# Patient Record
Sex: FEMALE | Race: OTHER | Employment: UNEMPLOYED
[De-identification: names, ages, dates, MRNs, and addresses within clinical notes are randomized per-mention and may not be internally consistent; named-entity substitution may affect disease eponyms.]

---

## 2017-01-09 ENCOUNTER — TELEPHONE (OUTPATIENT)
Dept: OBGYN | Facility: CLINIC | Age: 37
End: 2017-01-09

## 2017-01-09 RX ORDER — SULFAMETHOXAZOLE AND TRIMETHOPRIM 800; 160 MG/1; MG/1
1 TABLET ORAL 2 TIMES DAILY
Qty: 14 TABLET | Refills: 1 | Status: SHIPPED | OUTPATIENT
Start: 2017-01-09 | End: 2017-01-13 | Stop reason: ALTCHOICE

## 2017-01-13 ENCOUNTER — OFFICE VISIT (OUTPATIENT)
Dept: OBGYN | Facility: CLINIC | Age: 37
End: 2017-01-13

## 2017-01-13 VITALS
BODY MASS INDEX: 26.89 KG/M2 | SYSTOLIC BLOOD PRESSURE: 113 MMHG | RESPIRATION RATE: 18 BRPM | DIASTOLIC BLOOD PRESSURE: 66 MMHG | WEIGHT: 147 LBS | HEART RATE: 74 BPM

## 2017-01-13 DIAGNOSIS — L98.9 SKIN LESION: Primary | ICD-10-CM

## 2017-01-13 DIAGNOSIS — N76.0 ACUTE VAGINITIS: ICD-10-CM

## 2017-01-13 DIAGNOSIS — Z12.4 PAP SMEAR FOR CERVICAL CANCER SCREENING: ICD-10-CM

## 2017-01-13 DIAGNOSIS — Z01.419 WELL WOMAN EXAM: ICD-10-CM

## 2017-01-13 DIAGNOSIS — N95.1 MENOPAUSAL SYMPTOM: ICD-10-CM

## 2017-01-13 PROCEDURE — 99213 OFFICE O/P EST LOW 20 MIN: CPT | Performed by: SPECIALIST

## 2017-01-13 RX ORDER — METRONIDAZOLE 500 MG/1
500 TABLET ORAL 2 TIMES DAILY
Qty: 14 TABLET | Refills: 0 | Status: SHIPPED | OUTPATIENT
Start: 2017-01-13 | End: 2017-02-10 | Stop reason: SDUPTHER

## 2017-01-13 RX ORDER — FLUCONAZOLE 100 MG/1
100 TABLET ORAL DAILY
Qty: 7 TABLET | Refills: 0 | Status: SHIPPED | OUTPATIENT
Start: 2017-01-13 | End: 2017-02-10 | Stop reason: SDUPTHER

## 2017-01-13 ASSESSMENT — ENCOUNTER SYMPTOMS
COUGH: 0
APNEA: 0
CONSTIPATION: 0
DIARRHEA: 0
ABDOMINAL PAIN: 0
NAUSEA: 0
EYE PAIN: 0
VOMITING: 0
ABDOMINAL DISTENTION: 0

## 2017-01-27 ENCOUNTER — OFFICE VISIT (OUTPATIENT)
Dept: OBGYN | Facility: CLINIC | Age: 37
End: 2017-01-27

## 2017-01-27 VITALS
SYSTOLIC BLOOD PRESSURE: 130 MMHG | WEIGHT: 151 LBS | HEIGHT: 63 IN | RESPIRATION RATE: 18 BRPM | DIASTOLIC BLOOD PRESSURE: 83 MMHG | BODY MASS INDEX: 26.75 KG/M2 | HEART RATE: 78 BPM

## 2017-01-27 DIAGNOSIS — A60.00 GENITAL HERPES SIMPLEX, UNSPECIFIED SITE: Primary | ICD-10-CM

## 2017-01-27 PROCEDURE — 99212 OFFICE O/P EST SF 10 MIN: CPT | Performed by: SPECIALIST

## 2017-02-03 ASSESSMENT — ENCOUNTER SYMPTOMS
CONSTIPATION: 0
VOMITING: 0
ABDOMINAL DISTENTION: 0
COUGH: 0
APNEA: 0
DIARRHEA: 0
EYE PAIN: 0
ABDOMINAL PAIN: 0
NAUSEA: 0

## 2017-02-10 ENCOUNTER — TELEPHONE (OUTPATIENT)
Dept: OBGYN | Facility: CLINIC | Age: 37
End: 2017-02-10

## 2017-02-10 RX ORDER — METRONIDAZOLE 500 MG/1
500 TABLET ORAL 2 TIMES DAILY
Qty: 14 TABLET | Refills: 0 | Status: SHIPPED | OUTPATIENT
Start: 2017-02-10 | End: 2017-02-17

## 2017-02-10 RX ORDER — FLUCONAZOLE 100 MG/1
100 TABLET ORAL DAILY
Qty: 7 TABLET | Refills: 0 | Status: SHIPPED | OUTPATIENT
Start: 2017-02-10 | End: 2017-02-17

## 2017-06-20 ENCOUNTER — TELEPHONE (OUTPATIENT)
Dept: OBGYN CLINIC | Age: 37
End: 2017-06-20

## 2017-06-20 RX ORDER — FLUCONAZOLE 100 MG/1
100 TABLET ORAL DAILY
Qty: 7 TABLET | Refills: 0 | Status: SHIPPED | OUTPATIENT
Start: 2017-06-20 | End: 2017-06-27

## 2017-06-20 RX ORDER — METRONIDAZOLE 500 MG/1
500 TABLET ORAL 2 TIMES DAILY
Qty: 14 TABLET | Refills: 0 | Status: SHIPPED | OUTPATIENT
Start: 2017-06-20 | End: 2017-06-27

## 2017-07-19 ENCOUNTER — TELEPHONE (OUTPATIENT)
Dept: OBGYN CLINIC | Age: 37
End: 2017-07-19

## 2017-07-19 RX ORDER — METRONIDAZOLE 500 MG/1
500 TABLET ORAL 2 TIMES DAILY
Qty: 14 TABLET | Refills: 0 | Status: SHIPPED | OUTPATIENT
Start: 2017-07-19 | End: 2017-07-26

## 2017-07-19 RX ORDER — FLUCONAZOLE 100 MG/1
100 TABLET ORAL DAILY
Qty: 7 TABLET | Refills: 0 | Status: SHIPPED | OUTPATIENT
Start: 2017-07-19 | End: 2017-07-26

## 2017-08-21 ENCOUNTER — TELEPHONE (OUTPATIENT)
Dept: OBGYN CLINIC | Age: 37
End: 2017-08-21

## 2017-08-21 DIAGNOSIS — N76.0 ACUTE VAGINITIS: Primary | ICD-10-CM

## 2017-08-21 RX ORDER — METRONIDAZOLE 500 MG/1
500 TABLET ORAL 2 TIMES DAILY
Qty: 14 TABLET | Refills: 0 | Status: SHIPPED | OUTPATIENT
Start: 2017-08-21 | End: 2017-08-28

## 2017-08-21 RX ORDER — FLUCONAZOLE 100 MG/1
100 TABLET ORAL DAILY
Qty: 7 TABLET | Refills: 0 | Status: SHIPPED | OUTPATIENT
Start: 2017-08-21 | End: 2017-08-28

## 2017-10-06 ENCOUNTER — TELEPHONE (OUTPATIENT)
Dept: OBGYN CLINIC | Age: 37
End: 2017-10-06

## 2017-10-06 NOTE — TELEPHONE ENCOUNTER
Patient calls, complains of white vaginal discharge with odor and itching. Message routed to DR for further action.

## 2017-10-08 RX ORDER — FLUCONAZOLE 100 MG/1
100 TABLET ORAL DAILY
Qty: 7 TABLET | Refills: 0 | Status: SHIPPED | OUTPATIENT
Start: 2017-10-08 | End: 2017-10-15

## 2017-10-08 RX ORDER — METRONIDAZOLE 500 MG/1
500 TABLET ORAL 2 TIMES DAILY
Qty: 14 TABLET | Refills: 0 | Status: SHIPPED | OUTPATIENT
Start: 2017-10-08 | End: 2017-10-15

## 2017-11-24 ENCOUNTER — TELEPHONE (OUTPATIENT)
Dept: OBGYN CLINIC | Age: 37
End: 2017-11-24

## 2017-11-24 RX ORDER — METRONIDAZOLE 500 MG/1
500 TABLET ORAL 2 TIMES DAILY
Qty: 14 TABLET | Refills: 0 | Status: SHIPPED | OUTPATIENT
Start: 2017-11-24 | End: 2017-12-01

## 2017-11-24 RX ORDER — FLUCONAZOLE 100 MG/1
100 TABLET ORAL DAILY
Qty: 7 TABLET | Refills: 0 | Status: SHIPPED | OUTPATIENT
Start: 2017-11-24 | End: 2017-12-01

## 2017-11-28 ENCOUNTER — HOSPITAL ENCOUNTER (OUTPATIENT)
Age: 37
Setting detail: SPECIMEN
Discharge: HOME | End: 2017-11-28

## 2017-11-28 ENCOUNTER — OFFICE VISIT (OUTPATIENT)
Dept: OBGYN CLINIC | Age: 37
End: 2017-11-28

## 2017-11-28 VITALS
DIASTOLIC BLOOD PRESSURE: 77 MMHG | RESPIRATION RATE: 18 BRPM | WEIGHT: 148 LBS | HEART RATE: 78 BPM | BODY MASS INDEX: 26.22 KG/M2 | SYSTOLIC BLOOD PRESSURE: 113 MMHG

## 2017-11-28 DIAGNOSIS — N76.0 ACUTE VAGINITIS: Primary | ICD-10-CM

## 2017-11-28 PROCEDURE — 99213 OFFICE O/P EST LOW 20 MIN: CPT | Performed by: SPECIALIST

## 2017-11-28 RX ORDER — METRONIDAZOLE 500 MG/1
500 TABLET ORAL 2 TIMES DAILY
Qty: 14 TABLET | Refills: 0 | Status: SHIPPED | OUTPATIENT
Start: 2017-11-28 | End: 2017-12-05

## 2017-11-28 ASSESSMENT — ENCOUNTER SYMPTOMS
ABDOMINAL DISTENTION: 0
EYE PAIN: 0
CONSTIPATION: 0
APNEA: 0
COUGH: 0
NAUSEA: 0
VOMITING: 0
ABDOMINAL PAIN: 0
DIARRHEA: 0

## 2017-11-28 NOTE — PROGRESS NOTES
Subjective:      Patient ID: Elisa Faria is a 44 y.o. female. Chief Complaint   Patient presents with    Vaginal Discharge     Patient got flagyl and diflucan last week and she feels like it is not working. She is still having discharge and odor.  Vaginal Odor     /77 (Site: Left Arm, Position: Sitting, Cuff Size: Small Adult)   Pulse 78   Resp 18   Wt 148 lb (67.1 kg)   BMI 26.22 kg/m²   No LMP recorded. Patient has had a hysterectomy. No obstetric history on file. No past medical history on file. Current Outpatient Prescriptions Ordered in [facility]   Medication Sig Dispense Refill    terconazole (TERAZOL 7) 0.4 % vaginal cream One applicator intravaginally at bedtime for 7 days. 1 Tube 0    metroNIDAZOLE (FLAGYL) 500 MG tablet Take 1 tablet by mouth 2 times daily for 7 days 14 tablet 0    metroNIDAZOLE (FLAGYL) 500 MG tablet Take 1 tablet by mouth 2 times daily for 7 days 14 tablet 0    fluconazole (DIFLUCAN) 100 MG tablet Take 1 tablet by mouth daily for 7 days 7 tablet 0     No current Epic-ordered facility-administered medications on file. Problem List Items Addressed This Visit     None      Visit Diagnoses     Acute vaginitis    -  Primary    Relevant Orders    VAGINITIS DNA PROBE        No Known Allergies  Orders Placed This Encounter   Procedures    VAGINITIS DNA PROBE     Standing Status:   Future     Standing Expiration Date:   11/28/2018        Patient is here today complaining of a vaginal discharge with odor. She states that she took Flagyl and Diflucan last week, but it is not working. She denies nausea, vomiting and fever. Review of Systems   Constitutional: Negative for activity change, appetite change and fever. HENT: Negative for ear discharge and ear pain. Eyes: Negative for pain and visual disturbance. Respiratory: Negative for apnea and cough. Cardiovascular: Negative for chest pain, palpitations and leg swelling.    Gastrointestinal: Negative for abdominal distention, abdominal pain, constipation, diarrhea, nausea and vomiting. Endocrine: Negative. Genitourinary: Negative for difficulty urinating, dysuria, menstrual problem and pelvic pain. Musculoskeletal: Negative for neck pain and neck stiffness. Skin: Negative. Neurological: Negative for light-headedness and numbness. Hematological: Negative. Does not bruise/bleed easily. Objective:   Physical Exam   Constitutional: She is oriented to person, place, and time. Vital signs are normal. She appears well-developed and well-nourished. HENT:   Head: Normocephalic and atraumatic. Neck: Normal range of motion. Neck supple. No thyromegaly present. Cardiovascular: Normal rate and regular rhythm. Pulmonary/Chest: Effort normal and breath sounds normal. She has no wheezes. Abdominal: Soft. Bowel sounds are normal. She exhibits no distension and no mass. There is no tenderness. There is no guarding. Genitourinary: Cervix exhibits discharge. Vaginal discharge found. Genitourinary Comments:      Musculoskeletal: Normal range of motion. Neurological: She is alert and oriented to person, place, and time. Skin: Skin is dry. Psychiatric: She has a normal mood and affect. Her behavior is normal. Thought content normal.   Nursing note and vitals reviewed. Assessment:      Patient with vaginitis. Cultures were done. Will treat with another course of Flagyl and Terazol 7. Plan:      Orders Placed This Encounter   Procedures    VAGINITIS DNA PROBE     Orders Placed This Encounter   Medications    terconazole (TERAZOL 7) 0.4 % vaginal cream     Sig: One applicator intravaginally at bedtime for 7 days.      Dispense:  1 Tube     Refill:  0    metroNIDAZOLE (FLAGYL) 500 MG tablet     Sig: Take 1 tablet by mouth 2 times daily for 7 days     Dispense:  14 tablet     Refill:  0      Appointment for annual exam.    Jo Ann THOMAS am scribing for, and in the

## 2017-11-29 LAB
DIRECT EXAM: NORMAL
Lab: NORMAL
SPECIMEN DESCRIPTION: NORMAL
STATUS: NORMAL

## 2018-01-16 ENCOUNTER — TELEPHONE (OUTPATIENT)
Dept: OBGYN CLINIC | Age: 38
End: 2018-01-16

## 2018-01-16 RX ORDER — ACYCLOVIR 200 MG/1
200 CAPSULE ORAL 3 TIMES DAILY
Qty: 30 CAPSULE | Refills: 1 | Status: SHIPPED | OUTPATIENT
Start: 2018-01-16 | End: 2018-06-29 | Stop reason: SDUPTHER

## 2018-01-16 RX ORDER — FLUCONAZOLE 100 MG/1
100 TABLET ORAL DAILY
Qty: 7 TABLET | Refills: 0 | Status: SHIPPED | OUTPATIENT
Start: 2018-01-16 | End: 2018-01-23

## 2018-05-29 ENCOUNTER — HOSPITAL ENCOUNTER (OUTPATIENT)
Age: 38
Setting detail: SPECIMEN
Discharge: HOME | End: 2018-05-29

## 2018-05-29 ENCOUNTER — OFFICE VISIT (OUTPATIENT)
Dept: OBGYN CLINIC | Age: 38
End: 2018-05-29

## 2018-05-29 VITALS
RESPIRATION RATE: 18 BRPM | DIASTOLIC BLOOD PRESSURE: 81 MMHG | HEIGHT: 63 IN | BODY MASS INDEX: 26.58 KG/M2 | SYSTOLIC BLOOD PRESSURE: 118 MMHG | WEIGHT: 150 LBS | HEART RATE: 72 BPM

## 2018-05-29 DIAGNOSIS — Z01.419 WELL WOMAN EXAM: Primary | ICD-10-CM

## 2018-05-29 DIAGNOSIS — N76.0 ACUTE VAGINITIS: ICD-10-CM

## 2018-05-29 DIAGNOSIS — Z12.72 SCREENING FOR VAGINAL CANCER: ICD-10-CM

## 2018-05-29 DIAGNOSIS — Z90.710 STATUS POST HYSTERECTOMY: ICD-10-CM

## 2018-05-29 PROCEDURE — 99395 PREV VISIT EST AGE 18-39: CPT | Performed by: SPECIALIST

## 2018-05-29 RX ORDER — FLUCONAZOLE 100 MG/1
100 TABLET ORAL DAILY
Qty: 7 TABLET | Refills: 0 | Status: SHIPPED | OUTPATIENT
Start: 2018-05-29 | End: 2018-05-29 | Stop reason: SDUPTHER

## 2018-05-29 RX ORDER — METRONIDAZOLE 500 MG/1
500 TABLET ORAL 2 TIMES DAILY
Qty: 14 TABLET | Refills: 0 | Status: SHIPPED | OUTPATIENT
Start: 2018-05-29 | End: 2018-06-05

## 2018-05-29 RX ORDER — FLUCONAZOLE 100 MG/1
100 TABLET ORAL DAILY
Qty: 7 TABLET | Refills: 0 | Status: SHIPPED | OUTPATIENT
Start: 2018-05-29 | End: 2018-06-05

## 2018-05-29 ASSESSMENT — ENCOUNTER SYMPTOMS
APNEA: 0
ABDOMINAL DISTENTION: 0
EYE PAIN: 0
CONSTIPATION: 0
NAUSEA: 0
DIARRHEA: 0
COUGH: 0
VOMITING: 0
ABDOMINAL PAIN: 0

## 2018-06-20 LAB — CYTOLOGY REPORT: NORMAL

## 2018-06-29 RX ORDER — ACYCLOVIR 200 MG/1
200 CAPSULE ORAL 3 TIMES DAILY
Qty: 30 CAPSULE | Refills: 1 | Status: SHIPPED | OUTPATIENT
Start: 2018-06-29 | End: 2018-07-09

## 2018-08-06 ENCOUNTER — TELEPHONE (OUTPATIENT)
Dept: OBGYN CLINIC | Age: 38
End: 2018-08-06

## 2018-08-06 RX ORDER — SULFAMETHOXAZOLE AND TRIMETHOPRIM 800; 160 MG/1; MG/1
1 TABLET ORAL 2 TIMES DAILY
Qty: 14 TABLET | Refills: 0 | Status: SHIPPED | OUTPATIENT
Start: 2018-08-06 | End: 2018-08-16

## 2018-09-21 ENCOUNTER — TELEPHONE (OUTPATIENT)
Dept: OBGYN CLINIC | Age: 38
End: 2018-09-21

## 2018-09-22 RX ORDER — FLUCONAZOLE 100 MG/1
100 TABLET ORAL DAILY
Qty: 7 TABLET | Refills: 0 | Status: SHIPPED | OUTPATIENT
Start: 2018-09-22 | End: 2018-10-01 | Stop reason: SDUPTHER

## 2018-09-22 RX ORDER — METRONIDAZOLE 500 MG/1
500 TABLET ORAL 2 TIMES DAILY
Qty: 14 TABLET | Refills: 0 | Status: SHIPPED | OUTPATIENT
Start: 2018-09-22 | End: 2018-10-01 | Stop reason: SDUPTHER

## 2018-10-01 ENCOUNTER — TELEPHONE (OUTPATIENT)
Dept: OBGYN CLINIC | Age: 38
End: 2018-10-01

## 2018-10-01 RX ORDER — METRONIDAZOLE 500 MG/1
500 TABLET ORAL 2 TIMES DAILY
Qty: 14 TABLET | Refills: 0 | Status: SHIPPED | OUTPATIENT
Start: 2018-10-01 | End: 2018-10-08

## 2018-10-01 RX ORDER — FLUCONAZOLE 100 MG/1
100 TABLET ORAL DAILY
Qty: 7 TABLET | Refills: 0 | Status: SHIPPED | OUTPATIENT
Start: 2018-10-01 | End: 2018-10-08

## 2018-10-12 ENCOUNTER — OFFICE VISIT (OUTPATIENT)
Dept: OBGYN CLINIC | Age: 38
End: 2018-10-12

## 2018-10-12 VITALS
DIASTOLIC BLOOD PRESSURE: 83 MMHG | SYSTOLIC BLOOD PRESSURE: 115 MMHG | WEIGHT: 152 LBS | HEART RATE: 80 BPM | HEIGHT: 62 IN | RESPIRATION RATE: 16 BRPM | BODY MASS INDEX: 27.97 KG/M2

## 2018-10-12 DIAGNOSIS — R30.9 PAINFUL URINATION: Primary | ICD-10-CM

## 2018-10-12 DIAGNOSIS — Z78.0 POSTMENOPAUSE: ICD-10-CM

## 2018-10-12 LAB
BILIRUBIN, POC: NEGATIVE
BLOOD URINE, POC: NORMAL
CLARITY, POC: CLEAR
COLOR, POC: NORMAL
GLUCOSE URINE, POC: NEGATIVE
KETONES, POC: NEGATIVE
LEUKOCYTE EST, POC: NEGATIVE
NITRITE, POC: NEGATIVE
PH, POC: 5
PROTEIN, POC: NORMAL
SPECIFIC GRAVITY, POC: 1.03
UROBILINOGEN, POC: NORMAL

## 2018-10-12 PROCEDURE — 99213 OFFICE O/P EST LOW 20 MIN: CPT | Performed by: SPECIALIST

## 2018-10-12 PROCEDURE — 81002 URINALYSIS NONAUTO W/O SCOPE: CPT | Performed by: SPECIALIST

## 2018-10-12 RX ORDER — BUPROPION HYDROCHLORIDE 100 MG/1
100 TABLET, EXTENDED RELEASE ORAL 2 TIMES DAILY
Qty: 60 TABLET | Refills: 3 | Status: SHIPPED | OUTPATIENT
Start: 2018-10-12 | End: 2020-05-18 | Stop reason: SDUPTHER

## 2018-10-12 RX ORDER — ESTRADIOL 1 MG/1
1 TABLET ORAL DAILY
Qty: 21 TABLET | Refills: 3 | Status: SHIPPED | OUTPATIENT
Start: 2018-10-12 | End: 2019-04-10 | Stop reason: SDUPTHER

## 2018-10-12 ASSESSMENT — ENCOUNTER SYMPTOMS
EYE PAIN: 0
ABDOMINAL DISTENTION: 0
DIARRHEA: 0
APNEA: 0
ABDOMINAL PAIN: 0
VOMITING: 0
NAUSEA: 0
CONSTIPATION: 0
COUGH: 0

## 2018-10-12 NOTE — PROGRESS NOTES
for, and in the presence of Dr. Braden Martinez. Electronically signed by: Abigail Kruse 10/12/18 12:30 PM       I agree to the above documentation placed by my scribe Abigail Kruse. I reviewed the scribe's note and agree with the documented findings and plan of care. Any areas of disagreement are noted on the chart. I have personally evaluated this patient. Additional findings are as noted. I agree with the chief complaint, past medical history, past surgical history, allergies, medications, social and family history as documented unless otherwise noted below.      Electronically signed by Braden Martinez MD on 10/14/2018 at 5:16 AM

## 2018-11-27 ENCOUNTER — TELEPHONE (OUTPATIENT)
Dept: OBGYN CLINIC | Age: 38
End: 2018-11-27

## 2018-11-27 NOTE — TELEPHONE ENCOUNTER
Patient calls, complains of white vaginal discharge with itching and refill on zovirax. Message routed to DR for further action.

## 2018-11-28 RX ORDER — METRONIDAZOLE 500 MG/1
500 TABLET ORAL 2 TIMES DAILY
Qty: 14 TABLET | Refills: 0 | Status: SHIPPED | OUTPATIENT
Start: 2018-11-28 | End: 2018-12-05

## 2018-11-28 RX ORDER — FLUCONAZOLE 100 MG/1
100 TABLET ORAL DAILY
Qty: 7 TABLET | Refills: 0 | Status: SHIPPED | OUTPATIENT
Start: 2018-11-28 | End: 2018-12-05

## 2018-11-28 RX ORDER — ACYCLOVIR 200 MG/1
200 CAPSULE ORAL 3 TIMES DAILY
Qty: 30 CAPSULE | Refills: 1 | Status: SHIPPED | OUTPATIENT
Start: 2018-11-28 | End: 2018-12-08

## 2019-01-22 ENCOUNTER — TELEPHONE (OUTPATIENT)
Dept: OBGYN CLINIC | Age: 39
End: 2019-01-22

## 2019-01-22 RX ORDER — NITROFURANTOIN 25; 75 MG/1; MG/1
100 CAPSULE ORAL 2 TIMES DAILY
Qty: 20 CAPSULE | Refills: 0 | Status: SHIPPED | OUTPATIENT
Start: 2019-01-22 | End: 2019-02-01

## 2019-02-21 ENCOUNTER — TELEPHONE (OUTPATIENT)
Dept: OBGYN CLINIC | Age: 39
End: 2019-02-21

## 2019-02-21 RX ORDER — METRONIDAZOLE 500 MG/1
500 TABLET ORAL 2 TIMES DAILY
Qty: 14 TABLET | Refills: 0 | Status: SHIPPED | OUTPATIENT
Start: 2019-02-21 | End: 2019-02-28

## 2019-02-21 RX ORDER — FLUCONAZOLE 100 MG/1
100 TABLET ORAL DAILY
Qty: 7 TABLET | Refills: 0 | Status: SHIPPED | OUTPATIENT
Start: 2019-02-21 | End: 2019-02-28

## 2019-04-04 ENCOUNTER — OFFICE VISIT (OUTPATIENT)
Dept: OBGYN CLINIC | Age: 39
End: 2019-04-04

## 2019-04-04 ENCOUNTER — HOSPITAL ENCOUNTER (OUTPATIENT)
Age: 39
Setting detail: SPECIMEN
Discharge: HOME | End: 2019-04-04

## 2019-04-04 VITALS
WEIGHT: 151 LBS | DIASTOLIC BLOOD PRESSURE: 83 MMHG | SYSTOLIC BLOOD PRESSURE: 124 MMHG | BODY MASS INDEX: 27.79 KG/M2 | HEIGHT: 62 IN | HEART RATE: 96 BPM

## 2019-04-04 DIAGNOSIS — R45.86 MOOD SWING: ICD-10-CM

## 2019-04-04 DIAGNOSIS — Z90.710 STATUS POST HYSTERECTOMY: ICD-10-CM

## 2019-04-04 DIAGNOSIS — N76.0 ACUTE VAGINITIS: Primary | ICD-10-CM

## 2019-04-04 DIAGNOSIS — N76.0 ACUTE VAGINITIS: ICD-10-CM

## 2019-04-04 LAB
ESTRADIOL LEVEL: 380 PG/ML (ref 27–314)
FOLLICLE STIMULATING HORMONE: 4.8 U/L (ref 1.7–21.5)
LH: 11.6 U/L (ref 1–95.6)
TSH SERPL DL<=0.05 MIU/L-ACNC: 1.28 MIU/L (ref 0.3–5)

## 2019-04-04 PROCEDURE — 99213 OFFICE O/P EST LOW 20 MIN: CPT | Performed by: SPECIALIST

## 2019-04-04 RX ORDER — METRONIDAZOLE 500 MG/1
500 TABLET ORAL 2 TIMES DAILY
Qty: 14 TABLET | Refills: 0 | Status: SHIPPED | OUTPATIENT
Start: 2019-04-04 | End: 2019-04-11

## 2019-04-04 RX ORDER — FLUCONAZOLE 100 MG/1
100 TABLET ORAL DAILY
Qty: 7 TABLET | Refills: 0 | Status: SHIPPED | OUTPATIENT
Start: 2019-04-04 | End: 2019-04-11

## 2019-04-04 ASSESSMENT — ENCOUNTER SYMPTOMS
CONSTIPATION: 0
DIARRHEA: 0
EYE PAIN: 0
ABDOMINAL PAIN: 0
COUGH: 0
APNEA: 0
NAUSEA: 0
VOMITING: 0
ABDOMINAL DISTENTION: 0

## 2019-04-04 NOTE — PROGRESS NOTES
Subjective:      Patient ID: Ravinder Harry is a 43 y.o. female. Chief Complaint   Patient presents with    Vaginal Discharge     Patient is here today for vaginal discharge. She has no pain or itching      /83 (Site: Right Upper Arm, Position: Sitting, Cuff Size: Medium Adult)   Pulse 96   Ht 5' 2\" (1.575 m)   Wt 151 lb (68.5 kg)   BMI 27.62 kg/m²   No LMP recorded. Patient has had a hysterectomy. No obstetric history on file. No past medical history on file. Current Outpatient Medications Ordered in Epic   Medication Sig Dispense Refill    metroNIDAZOLE (FLAGYL) 500 MG tablet Take 1 tablet by mouth 2 times daily for 7 days 14 tablet 0    fluconazole (DIFLUCAN) 100 MG tablet Take 1 tablet by mouth daily for 7 days 7 tablet 0    estradiol (ESTRACE) 1 MG tablet Take 1 tablet by mouth daily 21 tablet 3    buPROPion (WELLBUTRIN SR) 100 MG extended release tablet Take 1 tablet by mouth 2 times daily 60 tablet 3     No current Epic-ordered facility-administered medications on file.       Problem List Items Addressed This Visit     None      Visit Diagnoses     Acute vaginitis    -  Primary    Relevant Orders    Follicle Stimulating Hormone    Luteinizing Hormone    TSH without Reflex    Estradiol    Mood swing        Relevant Orders    Follicle Stimulating Hormone    Luteinizing Hormone    TSH without Reflex    Estradiol    Status post hysterectomy        Relevant Orders    Follicle Stimulating Hormone    Luteinizing Hormone    TSH without Reflex    Estradiol        No Known Allergies  Orders Placed This Encounter   Procedures    Follicle Stimulating Hormone     Standing Status:   Future     Standing Expiration Date:   6/4/2019    Luteinizing Hormone     Standing Status:   Future     Standing Expiration Date:   6/4/2019    TSH without Reflex     Standing Status:   Future     Standing Expiration Date:   6/4/2019    Estradiol     Standing Status:   Future     Standing Expiration Date: Psychiatric: She has a normal mood and affect. Her behavior is normal. Thought content normal.   Nursing note and vitals reviewed. Assessment:      Patient with vaginitis. Will treat with Flagyl and Diflucan. Patient status post hysterectomy with mood swings. Will evaluate with FSH, LH, TSH, and Estradiol. Plan:       Orders Placed This Encounter   Procedures    Follicle Stimulating Hormone    Luteinizing Hormone    TSH without Reflex    Estradiol     Orders Placed This Encounter   Medications    metroNIDAZOLE (FLAGYL) 500 MG tablet     Sig: Take 1 tablet by mouth 2 times daily for 7 days     Dispense:  14 tablet     Refill:  0    fluconazole (DIFLUCAN) 100 MG tablet     Sig: Take 1 tablet by mouth daily for 7 days     Dispense:  7 tablet     Refill:  0      Appointment for annual exam.    IAbdoul, am scribing for, and in the presence of Dr. Magdaleno Arredondo. Electronically signed by: Abdoul Multani 4/4/19 2:06 PM       I agree to the above documentation placed by my scribe Abdoul Multani. I reviewed the scribe's note and agree with the documented findings and plan of care. Any areas of disagreement are noted on the chart. I have personally evaluated this patient. Additional findings are as noted. I agree with the chief complaint, past medical history, past surgical history, allergies, medications, social and family history as documented unless otherwise noted below.      Electronically signed by Magdaleno Arredondo MD on 4/6/2019 at 8:23 PM

## 2019-04-10 RX ORDER — ESTRADIOL 1 MG/1
1 TABLET ORAL DAILY
Qty: 21 TABLET | Refills: 3 | Status: SHIPPED | OUTPATIENT
Start: 2019-04-10 | End: 2020-05-18 | Stop reason: SDUPTHER

## 2019-06-06 ENCOUNTER — TELEPHONE (OUTPATIENT)
Dept: OBGYN CLINIC | Age: 39
End: 2019-06-06

## 2019-06-06 RX ORDER — METRONIDAZOLE 500 MG/1
500 TABLET ORAL 2 TIMES DAILY
Qty: 14 TABLET | Refills: 0 | Status: SHIPPED | OUTPATIENT
Start: 2019-06-06 | End: 2019-06-13

## 2019-06-06 RX ORDER — FLUCONAZOLE 100 MG/1
100 TABLET ORAL DAILY
Qty: 7 TABLET | Refills: 0 | Status: SHIPPED | OUTPATIENT
Start: 2019-06-06 | End: 2019-06-13

## 2019-06-06 NOTE — TELEPHONE ENCOUNTER
Patient called stating she is on antibiotics for an ear infection and she is now having a lot of vaginal discharge and is requesting medication to be sent

## 2019-06-26 RX ORDER — FLUCONAZOLE 100 MG/1
100 TABLET ORAL DAILY
COMMUNITY
End: 2019-06-26 | Stop reason: SDUPTHER

## 2019-06-26 RX ORDER — METRONIDAZOLE 500 MG/1
500 TABLET ORAL 2 TIMES DAILY
COMMUNITY
End: 2019-06-26 | Stop reason: SDUPTHER

## 2019-06-27 RX ORDER — FLUCONAZOLE 100 MG/1
100 TABLET ORAL DAILY
Qty: 7 TABLET | Refills: 0 | Status: SHIPPED | OUTPATIENT
Start: 2019-06-27 | End: 2020-08-10 | Stop reason: CLARIF

## 2019-06-27 RX ORDER — METRONIDAZOLE 500 MG/1
500 TABLET ORAL 2 TIMES DAILY
Qty: 14 TABLET | Refills: 0 | Status: SHIPPED | OUTPATIENT
Start: 2019-06-27 | End: 2020-08-10 | Stop reason: CLARIF

## 2019-07-30 ENCOUNTER — OFFICE VISIT (OUTPATIENT)
Dept: OBGYN CLINIC | Age: 39
End: 2019-07-30
Payer: MEDICAID

## 2019-07-30 ENCOUNTER — HOSPITAL ENCOUNTER (OUTPATIENT)
Age: 39
Setting detail: SPECIMEN
Discharge: HOME | End: 2019-07-30

## 2019-07-30 VITALS
BODY MASS INDEX: 27.23 KG/M2 | SYSTOLIC BLOOD PRESSURE: 118 MMHG | HEIGHT: 62 IN | HEART RATE: 72 BPM | DIASTOLIC BLOOD PRESSURE: 80 MMHG | WEIGHT: 148 LBS

## 2019-07-30 DIAGNOSIS — N64.4 BREAST PAIN: ICD-10-CM

## 2019-07-30 DIAGNOSIS — N63.20 LEFT BREAST LUMP: ICD-10-CM

## 2019-07-30 DIAGNOSIS — Z01.419 WELL WOMAN EXAM: Primary | ICD-10-CM

## 2019-07-30 DIAGNOSIS — N76.0 ACUTE VAGINITIS: ICD-10-CM

## 2019-07-30 DIAGNOSIS — Z90.710 STATUS POST HYSTERECTOMY: ICD-10-CM

## 2019-07-30 PROCEDURE — 99395 PREV VISIT EST AGE 18-39: CPT | Performed by: SPECIALIST

## 2019-07-30 RX ORDER — METRONIDAZOLE 500 MG/1
500 TABLET ORAL 2 TIMES DAILY
Qty: 14 TABLET | Refills: 0 | Status: SHIPPED | OUTPATIENT
Start: 2019-07-30 | End: 2019-08-06

## 2019-07-30 RX ORDER — FLUCONAZOLE 100 MG/1
100 TABLET ORAL DAILY
Qty: 7 TABLET | Refills: 0 | Status: SHIPPED | OUTPATIENT
Start: 2019-07-30 | End: 2019-08-06

## 2019-07-30 ASSESSMENT — ENCOUNTER SYMPTOMS
DIARRHEA: 0
NAUSEA: 0
CONSTIPATION: 0
APNEA: 0
ABDOMINAL DISTENTION: 0
ABDOMINAL PAIN: 0
VOMITING: 0
COUGH: 0
EYE PAIN: 0

## 2019-08-02 LAB
CYTOLOGY REPORT: NORMAL
HPV SAMPLE: NORMAL
HPV, GENOTYPE 16: NOT DETECTED
HPV, GENOTYPE 18: NOT DETECTED
HPV, HIGH RISK OTHER: NOT DETECTED
HPV, INTERPRETATION: NORMAL
SPECIMEN DESCRIPTION: NORMAL

## 2019-08-14 ENCOUNTER — HOSPITAL ENCOUNTER (OUTPATIENT)
Dept: MAMMOGRAPHY | Age: 39
Discharge: HOME | End: 2019-08-16

## 2019-08-14 DIAGNOSIS — N63.20 LEFT BREAST LUMP: ICD-10-CM

## 2019-08-14 DIAGNOSIS — N64.4 BREAST PAIN: ICD-10-CM

## 2019-08-14 DIAGNOSIS — Z90.710 STATUS POST HYSTERECTOMY: ICD-10-CM

## 2019-08-14 DIAGNOSIS — N76.0 ACUTE VAGINITIS: ICD-10-CM

## 2019-08-14 DIAGNOSIS — Z01.419 WELL WOMAN EXAM: ICD-10-CM

## 2019-08-14 PROCEDURE — 77066 DX MAMMO INCL CAD BI: CPT

## 2019-11-25 ENCOUNTER — TELEPHONE (OUTPATIENT)
Dept: OBGYN CLINIC | Age: 39
End: 2019-11-25

## 2019-11-25 RX ORDER — FLUCONAZOLE 100 MG/1
100 TABLET ORAL DAILY
Qty: 7 TABLET | Refills: 0 | Status: SHIPPED | OUTPATIENT
Start: 2019-11-25 | End: 2019-12-02

## 2019-11-25 RX ORDER — METRONIDAZOLE 500 MG/1
500 TABLET ORAL 2 TIMES DAILY
Qty: 14 TABLET | Refills: 0 | Status: SHIPPED | OUTPATIENT
Start: 2019-11-25 | End: 2019-12-02

## 2019-12-12 ENCOUNTER — TELEPHONE (OUTPATIENT)
Dept: OBGYN CLINIC | Age: 39
End: 2019-12-12

## 2019-12-13 RX ORDER — METRONIDAZOLE 500 MG/1
500 TABLET ORAL 2 TIMES DAILY
Qty: 14 TABLET | Refills: 0 | Status: SHIPPED | OUTPATIENT
Start: 2019-12-13 | End: 2019-12-20

## 2019-12-13 RX ORDER — FLUCONAZOLE 100 MG/1
100 TABLET ORAL DAILY
Qty: 7 TABLET | Refills: 0 | Status: SHIPPED | OUTPATIENT
Start: 2019-12-13 | End: 2019-12-20

## 2020-01-29 RX ORDER — FLUCONAZOLE 100 MG/1
100 TABLET ORAL DAILY
Qty: 7 TABLET | Refills: 0 | Status: SHIPPED | OUTPATIENT
Start: 2020-01-29 | End: 2020-02-05

## 2020-01-29 RX ORDER — METRONIDAZOLE 500 MG/1
500 TABLET ORAL 2 TIMES DAILY
Qty: 14 TABLET | Refills: 0 | Status: SHIPPED | OUTPATIENT
Start: 2020-01-29 | End: 2020-02-05

## 2020-04-02 ENCOUNTER — TELEPHONE (OUTPATIENT)
Dept: OBGYN CLINIC | Age: 40
End: 2020-04-02

## 2020-04-02 RX ORDER — FLUCONAZOLE 100 MG/1
100 TABLET ORAL DAILY
Qty: 7 TABLET | Refills: 0 | Status: SHIPPED | OUTPATIENT
Start: 2020-04-02 | End: 2020-04-09

## 2020-04-02 RX ORDER — METRONIDAZOLE 500 MG/1
500 TABLET ORAL 2 TIMES DAILY
Qty: 14 TABLET | Refills: 0 | Status: SHIPPED | OUTPATIENT
Start: 2020-04-02 | End: 2020-04-09

## 2020-05-18 ENCOUNTER — OFFICE VISIT (OUTPATIENT)
Dept: OBGYN CLINIC | Age: 40
End: 2020-05-18

## 2020-05-18 VITALS
WEIGHT: 161 LBS | TEMPERATURE: 98.4 F | BODY MASS INDEX: 29.63 KG/M2 | HEIGHT: 62 IN | DIASTOLIC BLOOD PRESSURE: 79 MMHG | SYSTOLIC BLOOD PRESSURE: 113 MMHG | HEART RATE: 76 BPM

## 2020-05-18 PROBLEM — N95.1 MENOPAUSAL SYMPTOMS: Status: ACTIVE | Noted: 2020-05-18

## 2020-05-18 PROBLEM — K42.9 UMBILICAL HERNIA WITHOUT OBSTRUCTION AND WITHOUT GANGRENE: Status: ACTIVE | Noted: 2020-05-18

## 2020-05-18 PROBLEM — Z90.710 STATUS POST HYSTERECTOMY: Status: ACTIVE | Noted: 2020-05-18

## 2020-05-18 PROCEDURE — 99213 OFFICE O/P EST LOW 20 MIN: CPT | Performed by: SPECIALIST

## 2020-05-18 RX ORDER — ESTRADIOL 1 MG/1
1 TABLET ORAL DAILY
Qty: 21 TABLET | Refills: 3 | Status: SHIPPED | OUTPATIENT
Start: 2020-05-18 | End: 2021-05-13

## 2020-05-18 RX ORDER — BUPROPION HYDROCHLORIDE 100 MG/1
100 TABLET, EXTENDED RELEASE ORAL 2 TIMES DAILY
Qty: 60 TABLET | Refills: 3 | Status: SHIPPED | OUTPATIENT
Start: 2020-05-18 | End: 2021-05-13

## 2020-05-18 RX ORDER — NITROFURANTOIN 25; 75 MG/1; MG/1
100 CAPSULE ORAL 2 TIMES DAILY
Qty: 20 CAPSULE | Refills: 0 | Status: SHIPPED | OUTPATIENT
Start: 2020-05-18 | End: 2020-05-28

## 2020-05-18 RX ORDER — VALACYCLOVIR HYDROCHLORIDE 500 MG/1
500 TABLET, FILM COATED ORAL 2 TIMES DAILY
Qty: 20 TABLET | Refills: 3 | Status: SHIPPED | OUTPATIENT
Start: 2020-05-18 | End: 2020-05-28

## 2020-05-18 ASSESSMENT — ENCOUNTER SYMPTOMS
ABDOMINAL PAIN: 0
APNEA: 0
EYE PAIN: 0
CONSTIPATION: 0
VOMITING: 0
NAUSEA: 0
DIARRHEA: 0
ABDOMINAL DISTENTION: 0
COUGH: 0

## 2020-05-18 NOTE — PROGRESS NOTES
Sig: Take 1 tablet by mouth 2 times daily     Dispense:  60 tablet     Refill:  3    nitrofurantoin, macrocrystal-monohydrate, (MACROBID) 100 MG capsule     Sig: Take 1 capsule by mouth 2 times daily for 20 doses     Dispense:  20 capsule     Refill:  0    valACYclovir (VALTREX) 500 MG tablet     Sig: Take 1 tablet by mouth 2 times daily for 10 days     Dispense:  20 tablet     Refill:  3      Appointment in 3 months. Anitra Yo am scribing for, and in the presence of Dr. Alanna Mejía. Electronically signed by: Dorina Mcginnis 5/18/20 2:27 PM        I agree to the above documentation placed by my scribe Dorina Mcginnis. I reviewed the scribe's note and agree with the documented findings and plan of care. Any areas of disagreement are noted on the chart. I have personally evaluated this patient. Additional findings are as noted. I agree with the chief complaint, past medical history, past surgical history, allergies, medications, social and family history as documented unless otherwise noted below.      Electronically signed by Alanna Mejía MD on 5/19/2020 at 12:49 AM

## 2020-05-19 PROBLEM — N39.0 URINARY TRACT INFECTION WITHOUT HEMATURIA: Status: ACTIVE | Noted: 2020-05-19

## 2020-06-02 ENCOUNTER — TELEPHONE (OUTPATIENT)
Dept: OBGYN CLINIC | Age: 40
End: 2020-06-02

## 2020-06-02 RX ORDER — FLUCONAZOLE 100 MG/1
100 TABLET ORAL DAILY
Qty: 7 TABLET | Refills: 0 | Status: SHIPPED | OUTPATIENT
Start: 2020-06-02 | End: 2020-06-09

## 2020-06-02 RX ORDER — METRONIDAZOLE 500 MG/1
500 TABLET ORAL 2 TIMES DAILY
Qty: 14 TABLET | Refills: 0 | Status: SHIPPED | OUTPATIENT
Start: 2020-06-02 | End: 2020-06-09

## 2020-06-23 ENCOUNTER — TELEPHONE (OUTPATIENT)
Dept: OBGYN CLINIC | Age: 40
End: 2020-06-23

## 2020-06-23 NOTE — TELEPHONE ENCOUNTER
Pt called regarding medication that was supposed to have been called in 6/20/20. She is at the pharmacy and they do not have anything, I do not see anything since 6/2/20. Please advise. She would like Marisol to call her back.

## 2020-08-10 ENCOUNTER — OFFICE VISIT (OUTPATIENT)
Dept: OBGYN CLINIC | Age: 40
End: 2020-08-10

## 2020-08-10 VITALS
SYSTOLIC BLOOD PRESSURE: 121 MMHG | DIASTOLIC BLOOD PRESSURE: 70 MMHG | WEIGHT: 155 LBS | HEART RATE: 81 BPM | BODY MASS INDEX: 28.52 KG/M2 | HEIGHT: 62 IN

## 2020-08-10 PROBLEM — R10.2 PELVIC PAIN IN FEMALE: Status: ACTIVE | Noted: 2020-08-10

## 2020-08-10 PROCEDURE — 99213 OFFICE O/P EST LOW 20 MIN: CPT | Performed by: SPECIALIST

## 2020-08-10 RX ORDER — ESTRADIOL 0.04 MG/D
1 FILM, EXTENDED RELEASE TRANSDERMAL
Qty: 8 PATCH | Refills: 3 | Status: SHIPPED | OUTPATIENT
Start: 2020-08-10 | End: 2021-01-25

## 2020-08-10 ASSESSMENT — ENCOUNTER SYMPTOMS
COUGH: 0
ABDOMINAL PAIN: 0
NAUSEA: 0
CONSTIPATION: 0
VOMITING: 0
APNEA: 0
DIARRHEA: 0
EYE PAIN: 0
ABDOMINAL DISTENTION: 0

## 2020-08-10 NOTE — PROGRESS NOTES
Subjective:      Patient ID: Haley Ulrich is a 35 y.o. female. Chief Complaint   Patient presents with    Pelvic Pain     patient is here today because she has been having low abdominal pain for 1 week. Patient states she feels swollen and having sharp pains     /70 (Site: Left Upper Arm, Position: Sitting, Cuff Size: Medium Adult)   Pulse 81   Ht 5' 2\" (1.575 m)   Wt 155 lb (70.3 kg)   BMI 28.35 kg/m²   No LMP recorded. Patient has had a hysterectomy. No obstetric history on file. No past medical history on file. Current Outpatient Medications Ordered in Epic   Medication Sig Dispense Refill    estradiol (VIVELLE-DOT) 0.0375 MG/24HR Place 1 patch onto the skin Twice a Week 8 patch 3    estradiol (ESTRACE) 1 MG tablet Take 1 tablet by mouth daily 21 tablet 3    buPROPion (WELLBUTRIN SR) 100 MG extended release tablet Take 1 tablet by mouth 2 times daily 60 tablet 3     No current Epic-ordered facility-administered medications on file. Problem List Items Addressed This Visit     Menopausal symptom    Relevant Orders    US NON OB TRANSVAGINAL    Status post hysterectomy    Relevant Orders    US NON OB TRANSVAGINAL    Pelvic pain in female - Primary    Relevant Orders    US NON OB TRANSVAGINAL        No Known Allergies  Orders Placed This Encounter   Procedures    US NON OB TRANSVAGINAL     Begin with transabdominal imaging. Standing Status:   Future     Standing Expiration Date:   8/10/2021     Order Specific Question:   Reason for exam:     Answer:   abnormal bleeding        Patient is here today because she has been having low abdominal pain for 1 week. Patient states she feels swollen and having sharp pains. She says that her hands and legs feel very swollen. She thinks her pain in is in her fallopian tubes. She had a hysterectomy done by a provider at [facility].   When the surgery was done, patient states that she was told her fallopian tubes may cause her problems in the future. Patient would prefer the estrogen patch for treatment of menopausal symptoms. She denies nausea, vomiting and fever. Review of Systems   Constitutional: Negative for activity change, appetite change and fever. HENT: Negative for ear discharge and ear pain. Eyes: Negative for pain and visual disturbance. Respiratory: Negative for apnea and cough. Cardiovascular: Negative for chest pain, palpitations and leg swelling. Gastrointestinal: Negative for abdominal distention, abdominal pain, constipation, diarrhea, nausea and vomiting. Endocrine: Negative. Genitourinary: Positive for pelvic pain. Negative for difficulty urinating, dysuria and menstrual problem. Musculoskeletal: Negative for neck pain and neck stiffness. Swelling of legs and hands     Skin: Negative. Neurological: Negative for light-headedness and numbness. Hematological: Negative. Does not bruise/bleed easily. Objective:   Physical Exam  Vitals signs and nursing note reviewed. Constitutional:       Appearance: She is well-developed. HENT:      Head: Normocephalic and atraumatic. Neck:      Musculoskeletal: Normal range of motion and neck supple. Thyroid: No thyromegaly. Cardiovascular:      Rate and Rhythm: Normal rate and regular rhythm. Pulmonary:      Effort: Pulmonary effort is normal.      Breath sounds: Normal breath sounds. No wheezing. Abdominal:      General: Bowel sounds are normal. There is no distension. Palpations: Abdomen is soft. There is no mass. Tenderness: There is no abdominal tenderness. There is no guarding. Musculoskeletal: Normal range of motion. Skin:     General: Skin is dry. Neurological:      Mental Status: She is alert and oriented to person, place, and time. Psychiatric:         Behavior: Behavior normal.         Thought Content: Thought content normal.         Assessment:      Patient post hysterectomy with pelvic pain.   Will evaluate with ultrasound. Explained to patient that she does not need a pap smear today as her pap smear 1 year ago was within normal limits. Patient requests refill of estrogen patches. Plan:      Orders Placed This Encounter   Procedures    US NON OB TRANSVAGINAL     Orders Placed This Encounter   Medications    estradiol (VIVELLE-DOT) 0.0375 MG/24HR     Sig: Place 1 patch onto the skin Twice a Week     Dispense:  8 patch     Refill:  3      Appointment for ultrasound. Trudi Miller am scribing for, and in the presence of Dr. Tammy Mello. Electronically signed by: Sukumar Quinteros 8/10/20 4:27 PM     I agree to the above documentation placed by my scribe Sukumar Quinteros. I reviewed the scribe's note and agree with the documented findings and plan of care. Any areas of disagreement are noted on the chart. I have personally evaluated this patient. Additional findings are as noted. I agree with the chief complaint, past medical history, past surgical history, allergies, medications, social and family history as documented unless otherwise noted below.      Electronically signed by Tammy Mello MD on 8/11/2020 at 4:00 AM

## 2020-09-02 ENCOUNTER — OFFICE VISIT (OUTPATIENT)
Dept: OBGYN CLINIC | Age: 40
End: 2020-09-02

## 2020-09-02 VITALS
SYSTOLIC BLOOD PRESSURE: 125 MMHG | HEART RATE: 78 BPM | HEIGHT: 62 IN | BODY MASS INDEX: 27.97 KG/M2 | DIASTOLIC BLOOD PRESSURE: 80 MMHG | WEIGHT: 152 LBS

## 2020-09-02 PROBLEM — N70.11 HYDROSALPINX: Status: ACTIVE | Noted: 2020-09-02

## 2020-09-02 PROCEDURE — 99213 OFFICE O/P EST LOW 20 MIN: CPT | Performed by: SPECIALIST

## 2020-09-02 RX ORDER — FLUCONAZOLE 100 MG/1
100 TABLET ORAL DAILY
Qty: 7 TABLET | Refills: 0 | Status: SHIPPED | OUTPATIENT
Start: 2020-09-02 | End: 2020-09-09

## 2020-09-02 RX ORDER — METRONIDAZOLE 500 MG/1
500 TABLET ORAL 2 TIMES DAILY
Qty: 14 TABLET | Refills: 0 | Status: SHIPPED | OUTPATIENT
Start: 2020-09-02 | End: 2020-09-09

## 2020-09-02 ASSESSMENT — ENCOUNTER SYMPTOMS
ABDOMINAL PAIN: 0
APNEA: 0
CONSTIPATION: 0
ABDOMINAL DISTENTION: 0
NAUSEA: 0
DIARRHEA: 0
EYE PAIN: 0
VOMITING: 0
COUGH: 0

## 2020-09-02 NOTE — PROGRESS NOTES
Subjective:      Patient ID: Harvinder Cheney is a 38 y.o. female. Chief Complaint   Patient presents with    Follow-up     Pt here to discuss US results     /80 (Site: Right Upper Arm, Position: Sitting, Cuff Size: Medium Adult)   Pulse 78   Ht 5' 2\" (1.575 m)   Wt 152 lb (68.9 kg)   Breastfeeding No   BMI 27.80 kg/m²   No LMP recorded. Patient has had a hysterectomy. No obstetric history on file. History reviewed. No pertinent past medical history. Current Outpatient Medications Ordered in Epic   Medication Sig Dispense Refill    metroNIDAZOLE (FLAGYL) 500 MG tablet Take 1 tablet by mouth 2 times daily for 7 days 14 tablet 0    fluconazole (DIFLUCAN) 100 MG tablet Take 1 tablet by mouth daily for 7 days 7 tablet 0    estradiol (VIVELLE-DOT) 0.0375 MG/24HR Place 1 patch onto the skin Twice a Week 8 patch 3    estradiol (ESTRACE) 1 MG tablet Take 1 tablet by mouth daily 21 tablet 3    buPROPion (WELLBUTRIN SR) 100 MG extended release tablet Take 1 tablet by mouth 2 times daily 60 tablet 3     No current Epic-ordered facility-administered medications on file. Problem List Items Addressed This Visit     Status post hysterectomy    Hydrosalpinx - Primary      Other Visit Diagnoses     Acute vaginitis            No Known Allergies  No orders of the defined types were placed in this encounter. Patient is here today to discuss the result of her ultrasound that was done for pelvic pain. She says that the pain comes and goes and is sometimes really bad. Patient also thinks that she has an infection because sh has a vaginal discharge. She denies nausea, vomiting and fever. Review of Systems   Constitutional: Negative for activity change, appetite change and fever. HENT: Negative for ear discharge and ear pain. Eyes: Negative for pain and visual disturbance. Respiratory: Negative for apnea and cough.     Cardiovascular: Negative for chest pain, palpitations and leg swelling. Gastrointestinal: Negative for abdominal distention, abdominal pain, constipation, diarrhea, nausea and vomiting. Endocrine: Negative. Genitourinary: Positive for pelvic pain and vaginal discharge. Negative for difficulty urinating, dysuria and menstrual problem. Musculoskeletal: Negative for neck pain and neck stiffness. Skin: Negative. Neurological: Negative for light-headedness and numbness. Hematological: Negative. Does not bruise/bleed easily. Objective:   Physical Exam  Vitals signs and nursing note reviewed. Constitutional:       Appearance: She is well-developed. HENT:      Head: Normocephalic and atraumatic. Neck:      Musculoskeletal: Normal range of motion and neck supple. Thyroid: No thyromegaly. Cardiovascular:      Rate and Rhythm: Normal rate and regular rhythm. Pulmonary:      Effort: Pulmonary effort is normal.      Breath sounds: Normal breath sounds. No wheezing. Abdominal:      General: Bowel sounds are normal. There is no distension. Palpations: Abdomen is soft. There is no mass. Tenderness: There is no abdominal tenderness. There is no guarding. Musculoskeletal: Normal range of motion. Skin:     General: Skin is dry. Neurological:      Mental Status: She is alert and oriented to person, place, and time. Psychiatric:         Behavior: Behavior normal.         Thought Content: Thought content normal.         Assessment:      Patient post hysterectomy with pelvic pain. Ultrasound showing hydrosalpinx was reviewed and explained to patient. Treatment options were discussed including expectant management and laparoscopic salpingectomy. Patient does not want to proceed with surgery at this time due to lack of insurance. Patient will call the office should she decide to proceed with surgery. Patient with clinical vaginitis. Will treat with Flagyl and Diflucan.        Plan:      Orders Placed This Encounter   Medications   

## 2020-09-04 PROBLEM — N76.0 ACUTE VAGINITIS: Status: ACTIVE | Noted: 2020-09-04

## 2020-09-09 ENCOUNTER — TELEPHONE (OUTPATIENT)
Dept: OBGYN CLINIC | Age: 40
End: 2020-09-09

## 2020-12-29 ENCOUNTER — HOSPITAL ENCOUNTER (OUTPATIENT)
Age: 40
Setting detail: SPECIMEN
Discharge: HOME | End: 2020-12-29

## 2020-12-29 ENCOUNTER — TELEPHONE (OUTPATIENT)
Dept: OBGYN CLINIC | Age: 40
End: 2020-12-29

## 2020-12-29 ENCOUNTER — OFFICE VISIT (OUTPATIENT)
Dept: OBGYN CLINIC | Age: 40
End: 2020-12-29

## 2020-12-29 VITALS
WEIGHT: 155 LBS | HEART RATE: 84 BPM | DIASTOLIC BLOOD PRESSURE: 84 MMHG | SYSTOLIC BLOOD PRESSURE: 123 MMHG | BODY MASS INDEX: 28.52 KG/M2 | HEIGHT: 62 IN | TEMPERATURE: 97.9 F

## 2020-12-29 PROBLEM — R09.89: Status: ACTIVE | Noted: 2020-12-29

## 2020-12-29 PROBLEM — N64.4 PAIN OF BOTH BREASTS: Status: ACTIVE | Noted: 2020-12-29

## 2020-12-29 PROBLEM — Z11.51 SCREENING FOR HUMAN PAPILLOMAVIRUS (HPV): Status: ACTIVE | Noted: 2020-12-29

## 2020-12-29 PROBLEM — A60.00 GENITAL HERPES SIMPLEX: Status: ACTIVE | Noted: 2020-12-29

## 2020-12-29 PROCEDURE — 99396 PREV VISIT EST AGE 40-64: CPT | Performed by: SPECIALIST

## 2020-12-29 RX ORDER — FLUCONAZOLE 100 MG/1
100 TABLET ORAL DAILY
Qty: 7 TABLET | Refills: 0 | Status: SHIPPED | OUTPATIENT
Start: 2020-12-29 | End: 2021-01-05

## 2020-12-29 RX ORDER — METRONIDAZOLE 500 MG/1
500 TABLET ORAL 2 TIMES DAILY
Qty: 14 TABLET | Refills: 0 | Status: SHIPPED | OUTPATIENT
Start: 2020-12-29 | End: 2021-01-05

## 2020-12-29 RX ORDER — VALACYCLOVIR HYDROCHLORIDE 500 MG/1
500 TABLET, FILM COATED ORAL 2 TIMES DAILY
Qty: 20 TABLET | Refills: 3 | Status: SHIPPED | OUTPATIENT
Start: 2020-12-29 | End: 2021-01-08

## 2020-12-29 ASSESSMENT — ENCOUNTER SYMPTOMS
ABDOMINAL PAIN: 0
NAUSEA: 0
COUGH: 0
ABDOMINAL DISTENTION: 0
CONSTIPATION: 0
EYE PAIN: 0
APNEA: 0
VOMITING: 0
DIARRHEA: 0

## 2020-12-29 ASSESSMENT — PATIENT HEALTH QUESTIONNAIRE - PHQ9
SUM OF ALL RESPONSES TO PHQ QUESTIONS 1-9: 0
SUM OF ALL RESPONSES TO PHQ9 QUESTIONS 1 & 2: 0
SUM OF ALL RESPONSES TO PHQ QUESTIONS 1-9: 0
1. LITTLE INTEREST OR PLEASURE IN DOING THINGS: 0
SUM OF ALL RESPONSES TO PHQ QUESTIONS 1-9: 0
2. FEELING DOWN, DEPRESSED OR HOPELESS: 0

## 2020-12-29 NOTE — PATIENT INSTRUCTIONS
Patient Education        Pap Test: Care Instructions  Your Care Instructions     The Pap test (also called a Pap smear) is a screening test for cancer of the cervix, which is the lower part of the uterus that opens into the vagina. The test can help your doctor find early changes in the cells that could lead to cancer. The sample of cells taken during your test has been sent to a lab so that an expert can look at the cells. It usually takes a week or two to get the results back. Follow-up care is a key part of your treatment and safety. Be sure to make and go to all appointments, and call your doctor if you are having problems. It's also a good idea to know your test results and keep a list of the medicines you take. What do the results mean? · A normal result means that the test did not find any abnormal cells in the sample. · An abnormal result can mean many things. Most of these are not cancer. The results of your test may be abnormal because:  ? You have an infection of the vagina or cervix, such as a yeast infection. ? You have an IUD (intrauterine device for birth control). ? You have low estrogen levels after menopause that are causing the cells to change. ? You have cell changes that may be a sign of precancer or cancer. The results are ranked based on how serious the changes might be. There are many other reasons why you might not get a normal result. If the results were abnormal, you may need to get another test within a few weeks or months. If the results show changes that could be a sign of cancer, you may need a test called a colposcopy, which provides a more complete view of the cervix. Sometimes the lab cannot use the sample because it does not contain enough cells or was not preserved well. If so, you may need to have the test again. This is not common, but it does happen from time to time. When should you call for help?   Watch closely for changes in your health, and be sure to contact your doctor if:    · You have vaginal bleeding or pain for more than 2 days after the test. It is normal to have a small amount of bleeding for a day or two after the test.   Where can you learn more? Go to https://sindy.[email or web address] org and sign in to your [employer] account. Enter K558 in the [employer] box to learn more about \"Pap Test: Care Instructions. \"     If you do not have an account, please click on the \"Sign Up Now\" link. Current as of: April 29, 2020               Content Version: 12.6  © 4766-9368 [employer]. Care instructions adapted under license by [facility] ([facility]). If you have questions about a medical condition or this instruction, always ask your healthcare professional. [address] any warranty or liability for your use of this information. Patient Education        Breast Self-Exam: Care Instructions  Your Care Instructions     A breast self-exam is when you check your breasts for lumps or changes. This regular exam helps you learn how your breasts normally look and feel. Most breast problems or changes are not because of cancer. Breast self-exam is not a substitute for a mammogram. Having regular breast exams by your doctor and regular mammograms improve your chances of finding any problems with your breasts. Some women set a time each month to do a step-by-step breast self-exam. Other women like a less formal system. They might look at their breasts as they brush their teeth, or feel their breasts once in a while in the shower. If you notice a change in your breast, tell your doctor. Follow-up care is a key part of your treatment and safety. Be sure to make and go to all appointments, and call your doctor if you are having problems. It's also a good idea to know your test results and keep a list of the medicines you take.   How do you do a breast self-exam?  · The best time to examine your breasts is usually one week after your menstrual period begins. Your breasts should not be tender then. If you do not have periods, you might do your exam on a day of the month that is easy to remember. · To examine your breasts:  ? Remove all your clothes above the waist and lie down. When you are lying down, your breast tissue spreads evenly over your chest wall, which makes it easier to feel all your breast tissue. ? Use the pads--not the fingertips--of the 3 middle fingers of your left hand to check your right breast. Move your fingers slowly in small coin-sized circles that overlap. ? Use three levels of pressure to feel of all your breast tissue. Use light pressure to feel the tissue close to the skin surface. Use medium pressure to feel a little deeper. Use firm pressure to feel your tissue close to your breastbone and ribs. Use each pressure level to feel your breast tissue before moving on to the next spot. ? Check your entire breast, moving up and down as if following a strip from the collarbone to the bra line, and from the armpit to the ribs. Repeat until you have covered the entire breast.  ? Repeat this procedure for your left breast, using the pads of the 3 middle fingers of your right hand. · To examine your breasts while in the shower:  ? Place one arm over your head and lightly soap your breast on that side. ? Using the pads of your fingers, gently move your hand over your breast (in the strip pattern described above), feeling carefully for any lumps or changes. ? Repeat for the other breast.  · Have your doctor inspect anything you notice to see if you need further testing. Where can you learn more? Go to https://Cardioroboticscharles.[employer]4MD. org and sign in to your [employer] account. Enter P148 in the [facility] box to learn more about \"Breast Self-Exam: Care Instructions. \"     If you do not have an account, please click on the \"Sign Up Now\" link.   Current as of: April 29, 2020               Content Version: 12.6  © 3682-6702 [employer]. Care instructions adapted under license by [facility] ([facility]). If you have questions about a medical condition or this instruction, always ask your healthcare professional. [address] any warranty or liability for your use of this information.

## 2020-12-29 NOTE — TELEPHONE ENCOUNTER
Referral faxed to [facility] Dr. Veronica Merino Colorectal surgery for swelling of rectal vein. Confirmation page received.

## 2020-12-29 NOTE — PROGRESS NOTES
Subjective:      Patient ID: Luis Odom is a 38 y.o. female. Chief Complaint   Patient presents with    Gynecologic Exam     having herpes outbreak currently; says her breasts hurt sometimes and wants a mammogram     /84   Pulse 84   Temp 97.9 °F (36.6 °C) (Infrared)   Ht 5' 2\" (1.575 m)   Wt 155 lb (70.3 kg)   BMI 28.35 kg/m²   No LMP recorded. Patient has had a hysterectomy. No obstetric history on file. History reviewed. No pertinent past medical history. Current Outpatient Medications Ordered in Epic   Medication Sig Dispense Refill    metroNIDAZOLE (FLAGYL) 500 MG tablet Take 1 tablet by mouth 2 times daily for 7 days 14 tablet 0    fluconazole (DIFLUCAN) 100 MG tablet Take 1 tablet by mouth daily for 7 days 7 tablet 0    valACYclovir (VALTREX) 500 MG tablet Take 1 tablet by mouth 2 times daily for 10 days 20 tablet 3    estradiol (VIVELLE-DOT) 0.0375 MG/24HR Place 1 patch onto the skin Twice a Week (Patient not taking: Reported on 12/29/2020) 8 patch 3    estradiol (ESTRACE) 1 MG tablet Take 1 tablet by mouth daily (Patient not taking: Reported on 12/29/2020) 21 tablet 3    buPROPion (WELLBUTRIN SR) 100 MG extended release tablet Take 1 tablet by mouth 2 times daily (Patient not taking: Reported on 12/29/2020) 60 tablet 3     No current Epic-ordered facility-administered medications on file.       Problem List Items Addressed This Visit     Status post hysterectomy    Acute vaginitis    Screening for human papillomavirus (HPV)    Relevant Orders    PAP SMEAR    Pain of both breasts    Relevant Orders    PETRA DIAGNOSTIC W OR WO CAD BILATERAL    Swollen vein    Relevant Orders    ROSY - Lakeisha Kelley MD, Colorectal Surgery, [place]    Genital herpes simplex      Other Visit Diagnoses     Well woman exam with routine gynecological exam    -  Primary    Relevant Orders    PAP SMEAR        No Known Allergies  Orders Placed This Encounter   Procedures    PETRA DIAGNOSTIC W OR WO CAD BILATERAL     Standing Status:   Future     Standing Expiration Date:   2/28/2022    PAP SMEAR     Patient History:    No LMP recorded. Patient has had a hysterectomy. OBGYN Status: Hysterectomy  Past Surgical History:  No date: HYSTERECTOMY  Medications/Contraceptives Affecting Cytology     Estrogens Disp Start End     estradiol (VIVELLE-DOT) 0.0375 MG/24HR    8 patch 8/10/2020     Sig: Place 1 patch onto the skin Twice a Week    Route: Transdermal     estradiol (ESTRACE) 1 MG tablet    21 tablet 5/18/2020     Sig: Take 1 tablet by mouth daily    Route: Oral     Problem List       Edg Problems Affecting Cytology    Status post hysterectomy     Social History    Tobacco Use      Smoking status: Former Smoker      Smokeless tobacco: Never Used       Standing Status:   Future     Standing Expiration Date:   12/29/2021     Order Specific Question:   Collection Type     Answer: Thin Prep     Order Specific Question:   Prior Abnormal Pap Test     Answer:   No     Order Specific Question:   Screening or Diagnostic     Answer:   Screening     Order Specific Question:   HPV Requested? Answer:   Yes     Order Specific Question:   High Risk Patient     Answer:   Lucille Arnold MD, Colorectal Surgery, [place]     Referral Priority:   Routine     Referral Type:   Eval and Treat     Referral Reason:   Specialty Services Required     Referred to Provider:   Paramjit Johnson MD     Requested Specialty:   Colon and Rectal Surgery     Number of Visits Requested:   1        Patient is here today for her annual exam.  She states that she is having a herpes outbreak. Patient complains that she has been having pain in both breasts. Patient has had a hysterectomy. Patient complains of a swollen vein down by her rectum for no reason. Review of Systems   Constitutional: Negative for activity change, appetite change and fever. HENT: Negative for ear discharge and ear pain.     Eyes: Negative for pain and visual mass or anal fissure. Normal anal tone. Comments: Lesion of the intregluteal region greater on the right side close to the anus, compatible with herpes  Musculoskeletal: Normal range of motion. General: No tenderness. Skin:     General: Skin is warm and dry. Neurological:      Mental Status: She is alert and oriented to person, place, and time. Psychiatric:         Judgment: Judgment normal.         Assessment:      Patient post hysterectomy with herpes and vaginitis, otherwise normal annual exam.  Pap smear was done. Will treat with Flagyl, Diflucan and Valtrex. Will evaluate breast pain with diagnostic mammogram.  Will refer patient to colorectal surgeon for further evaluation of swelling of rectal vein. Plan:      Orders Placed This Encounter   Procedures    PETRA DIAGNOSTIC W OR WO CAD BILATERAL    PAP SMEAR    Evan Powers MD, Colorectal Surgery, [facility]     Orders Placed This Encounter   Medications    metroNIDAZOLE (FLAGYL) 500 MG tablet     Sig: Take 1 tablet by mouth 2 times daily for 7 days     Dispense:  14 tablet     Refill:  0    fluconazole (DIFLUCAN) 100 MG tablet     Sig: Take 1 tablet by mouth daily for 7 days     Dispense:  7 tablet     Refill:  0    valACYclovir (VALTREX) 500 MG tablet     Sig: Take 1 tablet by mouth 2 times daily for 10 days     Dispense:  20 tablet     Refill:  3      Appointment following mammogram.    I, Ana Draper, am scribing for, and in the presence of Dr. Raj Null. Electronically signed by: Ana Draper 12/29/20 10:53 AM       I agree to the above documentation placed by my scribe Ana Draper. I reviewed the scribe's note and agree with the documented findings and plan of care. Any areas of disagreement are noted on the chart. I have personally evaluated this patient. Additional findings are as noted.  I agree with the chief complaint, past medical history, past surgical history, allergies, medications, social and family history as documented unless otherwise noted below.      Electronically signed by Tom Hendrickson MD on 12/30/2020 at 2:39 AM

## 2020-12-31 LAB
HPV SAMPLE: NORMAL
HPV, GENOTYPE 16: NOT DETECTED
HPV, GENOTYPE 18: NOT DETECTED
HPV, HIGH RISK OTHER: NOT DETECTED
HPV, INTERPRETATION: NORMAL
SPECIMEN DESCRIPTION: NORMAL

## 2021-01-08 LAB — CYTOLOGY REPORT: NORMAL

## 2021-01-11 ENCOUNTER — TELEPHONE (OUTPATIENT)
Dept: OBGYN CLINIC | Age: 41
End: 2021-01-11

## 2021-01-11 NOTE — TELEPHONE ENCOUNTER
----- Message from Pierce Neal MD sent at 1/10/2021  3:39 AM EST -----  Pap is normal. Please notify patient of results and schedule a 1 year follow-up

## 2021-01-25 RX ORDER — ESTRADIOL 0.04 MG/D
FILM, EXTENDED RELEASE TRANSDERMAL
Qty: 8 PATCH | Refills: 3 | Status: SHIPPED | OUTPATIENT
Start: 2021-01-25 | End: 2021-05-13 | Stop reason: SDUPTHER

## 2021-01-28 PROBLEM — Z11.51 SCREENING FOR HUMAN PAPILLOMAVIRUS (HPV): Status: RESOLVED | Noted: 2020-12-29 | Resolved: 2021-01-28

## 2021-05-13 ENCOUNTER — TELEPHONE (OUTPATIENT)
Dept: OBGYN CLINIC | Age: 41
End: 2021-05-13

## 2021-05-13 ENCOUNTER — HOSPITAL ENCOUNTER (OUTPATIENT)
Age: 41
Setting detail: SPECIMEN
Discharge: HOME | End: 2021-05-13

## 2021-05-13 ENCOUNTER — OFFICE VISIT (OUTPATIENT)
Dept: OBGYN CLINIC | Age: 41
End: 2021-05-13

## 2021-05-13 VITALS
DIASTOLIC BLOOD PRESSURE: 79 MMHG | HEART RATE: 75 BPM | WEIGHT: 151 LBS | SYSTOLIC BLOOD PRESSURE: 117 MMHG | BODY MASS INDEX: 27.62 KG/M2

## 2021-05-13 DIAGNOSIS — R39.9 UTI SYMPTOMS: Primary | ICD-10-CM

## 2021-05-13 DIAGNOSIS — N76.0 ACUTE VAGINITIS: ICD-10-CM

## 2021-05-13 DIAGNOSIS — Z79.890 HORMONE REPLACEMENT THERAPY (HRT): ICD-10-CM

## 2021-05-13 DIAGNOSIS — N89.8 VAGINAL ITCHING: ICD-10-CM

## 2021-05-13 LAB
BILIRUBIN, POC: ABNORMAL
BLOOD URINE, POC: ABNORMAL
CLARITY, POC: CLEAR
COLOR, POC: ABNORMAL
DIRECT EXAM: ABNORMAL
GLUCOSE URINE, POC: ABNORMAL
KETONES, POC: ABNORMAL
LEUKOCYTE EST, POC: ABNORMAL
Lab: ABNORMAL
NITRITE, POC: ABNORMAL
PH, POC: 5
PROTEIN, POC: ABNORMAL
SPECIFIC GRAVITY, POC: 1.01
SPECIMEN DESCRIPTION: ABNORMAL
UROBILINOGEN, POC: ABNORMAL

## 2021-05-13 PROCEDURE — 81003 URINALYSIS AUTO W/O SCOPE: CPT | Performed by: CLINICAL NURSE SPECIALIST

## 2021-05-13 PROCEDURE — 99213 OFFICE O/P EST LOW 20 MIN: CPT | Performed by: CLINICAL NURSE SPECIALIST

## 2021-05-13 RX ORDER — ESTRADIOL 0.04 MG/D
FILM, EXTENDED RELEASE TRANSDERMAL
Qty: 8 PATCH | Refills: 6 | Status: SHIPPED | OUTPATIENT
Start: 2021-05-13 | End: 2021-09-15

## 2021-05-13 RX ORDER — CIPROFLOXACIN 250 MG/1
250 TABLET, FILM COATED ORAL 2 TIMES DAILY
Qty: 6 TABLET | Refills: 0 | Status: SHIPPED | OUTPATIENT
Start: 2021-05-13 | End: 2021-05-16

## 2021-05-13 RX ORDER — METRONIDAZOLE 500 MG/1
500 TABLET ORAL 2 TIMES DAILY
Qty: 14 TABLET | Refills: 0 | Status: SHIPPED | OUTPATIENT
Start: 2021-05-13 | End: 2021-05-20

## 2021-05-13 RX ORDER — FLUCONAZOLE 100 MG/1
100 TABLET ORAL DAILY
Qty: 7 TABLET | Refills: 0 | Status: SHIPPED | OUTPATIENT
Start: 2021-05-13 | End: 2021-05-20

## 2021-05-13 ASSESSMENT — PATIENT HEALTH QUESTIONNAIRE - PHQ9
SUM OF ALL RESPONSES TO PHQ9 QUESTIONS 1 & 2: 2
2. FEELING DOWN, DEPRESSED OR HOPELESS: 1
SUM OF ALL RESPONSES TO PHQ QUESTIONS 1-9: 2

## 2021-05-13 ASSESSMENT — ENCOUNTER SYMPTOMS
RESPIRATORY NEGATIVE: 1
EYES NEGATIVE: 1
ALLERGIC/IMMUNOLOGIC NEGATIVE: 1
BACK PAIN: 1
GASTROINTESTINAL NEGATIVE: 1

## 2021-05-13 NOTE — PROGRESS NOTES
use: No     Alcohol/week: 0.0 standard drinks     Comment: social    Drug use: No    Sexual activity: Never     Partners: Male   Lifestyle    Physical activity     Days per week: None     Minutes per session: None    Stress: None   Relationships    Social connections     Talks on phone: None     Gets together: None     Attends [employer]ism service: None     Active member of club or organization: None     Attends meetings of clubs or organizations: None     Relationship status: None    Intimate partner violence     Fear of current or ex partner: None     Emotionally abused: None     Physically abused: None     Forced sexual activity: None   Other Topics Concern    None   Social History Narrative    None      Current Outpatient Medications   Medication Sig Dispense Refill    estradiol (VIVELLE) 0.0375 MG/24HR APPLY 1 PATCH EXTERNALLY TO THE SKIN 2 TIMES A WEEK 8 patch 6    metroNIDAZOLE (FLAGYL) 500 MG tablet Take 1 tablet by mouth 2 times daily for 7 days 14 tablet 0    fluconazole (DIFLUCAN) 100 MG tablet Take 1 tablet by mouth daily for 7 days 7 tablet 0    ciprofloxacin (CIPRO) 250 MG tablet Take 1 tablet by mouth 2 times daily for 3 days 6 tablet 0     No current facility-administered medications for this visit. Objective:   Physical Exam  Vitals signs reviewed. Constitutional:       Appearance: She is well-developed. HENT:      Head: Normocephalic and atraumatic. Eyes:      Conjunctiva/sclera: Conjunctivae normal.   Neck:      Musculoskeletal: Normal range of motion and neck supple. Cardiovascular:      Rate and Rhythm: Normal rate and regular rhythm. Pulmonary:      Effort: Pulmonary effort is normal.      Breath sounds: Normal breath sounds. Abdominal:      General: Bowel sounds are normal.   Genitourinary:     Vagina: Vaginal discharge (thin yellow discharge) present. Musculoskeletal: Normal range of motion. Skin:     General: Skin is warm and dry.    Neurological:      Mental Status: She is oriented to person, place, and time. Psychiatric:         Behavior: Behavior normal.         Thought Content: Thought content normal.         Judgment: Judgment normal.         Assessment:            Diagnosis Orders   1. UTI symptoms  POCT Urinalysis No Micro (Auto)    ciprofloxacin (CIPRO) 250 MG tablet   2. Vaginal itching  VAGINITIS DNA PROBE   3. Acute vaginitis  metroNIDAZOLE (FLAGYL) 500 MG tablet    fluconazole (DIFLUCAN) 100 MG tablet   4. Hormone replacement therapy (HRT)         Plan:      Return for as needed. Patient was seen with total face to face time of 20 minutes. More than 50% of this visit was on counseling andeducation regarding the problems listed below and her options. She was also counseled on her preventative health maintenance recommendations and follow-up.     Electronically signed by: Dennis Dumont CNP

## 2021-05-14 ENCOUNTER — TELEPHONE (OUTPATIENT)
Dept: OBGYN CLINIC | Age: 41
End: 2021-05-14

## 2021-05-14 NOTE — TELEPHONE ENCOUNTER
----- Message from SHANNON Morris CNP sent at 5/13/2021  4:47 PM EDT -----  Please let the patient know that she has BV and yeast and I sent both medications this morning

## 2021-06-11 ENCOUNTER — OFFICE VISIT (OUTPATIENT)
Dept: OBGYN CLINIC | Age: 41
End: 2021-06-11

## 2021-06-11 ENCOUNTER — HOSPITAL ENCOUNTER (OUTPATIENT)
Age: 41
Setting detail: SPECIMEN
Discharge: HOME | End: 2021-06-11

## 2021-06-11 VITALS
DIASTOLIC BLOOD PRESSURE: 83 MMHG | SYSTOLIC BLOOD PRESSURE: 119 MMHG | BODY MASS INDEX: 27.8 KG/M2 | HEART RATE: 78 BPM | WEIGHT: 152 LBS

## 2021-06-11 DIAGNOSIS — N89.8 VAGINAL ITCHING: ICD-10-CM

## 2021-06-11 DIAGNOSIS — N94.9 VAGINAL BURNING: ICD-10-CM

## 2021-06-11 DIAGNOSIS — N76.0 ACUTE VAGINITIS: Primary | ICD-10-CM

## 2021-06-11 DIAGNOSIS — R39.9 UTI SYMPTOMS: ICD-10-CM

## 2021-06-11 LAB
AVERAGE GLUCOSE: 123
BILIRUBIN, POC: ABNORMAL
BLOOD URINE, POC: ABNORMAL
CHOLESTEROL, TOTAL: 184 MG/DL
CHOLESTEROL/HDL RATIO: NORMAL
CLARITY, POC: CLEAR
COLOR, POC: YELLOW
DIRECT EXAM: NORMAL
GLUCOSE URINE, POC: ABNORMAL
HBA1C MFR BLD: 5.9 %
HDLC SERPL-MCNC: 56 MG/DL (ref 35–70)
KETONES, POC: ABNORMAL
LDL CHOLESTEROL CALCULATED: 90 MG/DL (ref 0–160)
LEUKOCYTE EST, POC: ABNORMAL
Lab: NORMAL
NITRITE, POC: ABNORMAL
NONHDLC SERPL-MCNC: NORMAL MG/DL
PH, POC: 5
PROTEIN, POC: ABNORMAL
SPECIFIC GRAVITY, POC: 1.01
SPECIMEN DESCRIPTION: NORMAL
TRIGL SERPL-MCNC: 191 MG/DL
UROBILINOGEN, POC: ABNORMAL
VLDLC SERPL CALC-MCNC: 38 MG/DL

## 2021-06-11 PROCEDURE — 81003 URINALYSIS AUTO W/O SCOPE: CPT | Performed by: SPECIALIST

## 2021-06-11 PROCEDURE — 99213 OFFICE O/P EST LOW 20 MIN: CPT | Performed by: SPECIALIST

## 2021-06-11 RX ORDER — CLOTRIMAZOLE AND BETAMETHASONE DIPROPIONATE 10; .64 MG/G; MG/G
CREAM TOPICAL
Qty: 15 G | Refills: 1 | Status: SHIPPED | OUTPATIENT
Start: 2021-06-11 | End: 2021-09-15

## 2021-06-11 RX ORDER — FLUCONAZOLE 100 MG/1
100 TABLET ORAL DAILY
Qty: 7 TABLET | Refills: 0 | Status: SHIPPED | OUTPATIENT
Start: 2021-06-11 | End: 2021-06-18

## 2021-06-11 RX ORDER — METRONIDAZOLE 500 MG/1
500 TABLET ORAL 2 TIMES DAILY
Qty: 14 TABLET | Refills: 0 | Status: SHIPPED | OUTPATIENT
Start: 2021-06-11 | End: 2021-06-18

## 2021-06-14 LAB
C TRACH DNA GENITAL QL NAA+PROBE: NEGATIVE
N. GONORRHOEAE DNA: NEGATIVE
SPECIMEN DESCRIPTION: NORMAL

## 2021-09-13 ASSESSMENT — ENCOUNTER SYMPTOMS
NAUSEA: 0
DIARRHEA: 0
ABDOMINAL DISTENTION: 0
VOMITING: 0
EYE PAIN: 0
ABDOMINAL PAIN: 0
COUGH: 0
CONSTIPATION: 0
APNEA: 0

## 2021-09-13 NOTE — PROGRESS NOTES
Subjective:      Patient ID: Marlene Pérez is a 42 y.o. female. Chief Complaint   Patient presents with    Other     Vaginal burning, dryness, itching     /83 (Site: Right Upper Arm)   Pulse 78   Wt 152 lb (68.9 kg)   LMP  (LMP Unknown)   BMI 27.80 kg/m²   No LMP recorded (lmp unknown). Patient has had a hysterectomy. No obstetric history on file. History reviewed. No pertinent past medical history. Current Outpatient Medications Ordered in Epic   Medication Sig Dispense Refill    clotrimazole-betamethasone (LOTRISONE) 1-0.05 % cream Apply topically 2 times daily. 15 g 1    estradiol (VIVELLE) 0.0375 MG/24HR APPLY 1 PATCH EXTERNALLY TO THE SKIN 2 TIMES A WEEK 8 patch 6     No current Epic-ordered facility-administered medications on file. Problem List Items Addressed This Visit     UTI symptoms - Primary    Relevant Orders    POCT Urinalysis No Micro (Auto) (Completed)    Vaginal itching    Relevant Orders    C.trachomatis N.gonorrhoeae DNA (Completed)    VAGINITIS DNA PROBE (Completed)    Vaginal burning    Relevant Orders    C.trachomatis N.gonorrhoeae DNA (Completed)    VAGINITIS DNA PROBE (Completed)        No Known Allergies  Orders Placed This Encounter   Procedures    C.trachomatis N.gonorrhoeae DNA     Standing Status:   Future     Number of Occurrences:   1     Standing Expiration Date:   6/11/2022    VAGINITIS DNA PROBE     Standing Status:   Future     Number of Occurrences:   1     Standing Expiration Date:   6/11/2022    POCT Urinalysis No Micro (Auto)        Patient is here today complaining of vaginal dryness, itching and burning. Review of Systems   Constitutional: Negative for activity change, appetite change and fever. HENT: Negative for ear discharge and ear pain. Eyes: Negative for pain and visual disturbance. Respiratory: Negative for apnea and cough. Cardiovascular: Negative for chest pain, palpitations and leg swelling.    Gastrointestinal: Negative for abdominal distention, abdominal pain, constipation, diarrhea, nausea and vomiting. Endocrine: Negative. Genitourinary: Positive for vaginal pain (itching burning and dryness). Negative for difficulty urinating, dysuria, menstrual problem and pelvic pain. Musculoskeletal: Negative for neck pain and neck stiffness. Skin: Negative. Neurological: Negative for light-headedness and numbness. Hematological: Negative. Does not bruise/bleed easily. Objective:   Physical Exam  Vitals and nursing note reviewed. Constitutional:       Appearance: She is well-developed. HENT:      Head: Normocephalic and atraumatic. Neck:      Thyroid: No thyromegaly. Cardiovascular:      Rate and Rhythm: Normal rate and regular rhythm. Pulmonary:      Effort: Pulmonary effort is normal.      Breath sounds: Normal breath sounds. No wheezing. Abdominal:      General: Bowel sounds are normal. There is no distension. Palpations: Abdomen is soft. There is no mass. Tenderness: There is no abdominal tenderness. There is no guarding. Genitourinary:     Vagina: Vaginal discharge present. Musculoskeletal:         General: Normal range of motion. Cervical back: Normal range of motion and neck supple. Skin:     General: Skin is dry. Neurological:      Mental Status: She is alert and oriented to person, place, and time. Psychiatric:         Behavior: Behavior normal.         Thought Content: Thought content normal.         Assessment:      Patient with vaginitis. Urinalysis in office today is negative. Cultures were done. Will treat with Flagyl and Diflucan. Patient was advised to use Lotrisone cream externally for itching and irritation.         Plan:      Orders Placed This Encounter   Procedures    C.trachomatis N.gonorrhoeae DNA    VAGINITIS DNA PROBE    POCT Urinalysis No Micro (Auto)     Orders Placed This Encounter   Medications    clotrimazole-betamethasone (Luisa Danes) 1-0.05 % cream     Sig: Apply topically 2 times daily. Dispense:  15 g     Refill:  1    metroNIDAZOLE (FLAGYL) 500 MG tablet     Sig: Take 1 tablet by mouth 2 times daily for 7 days     Dispense:  14 tablet     Refill:  0    fluconazole (DIFLUCAN) 100 MG tablet     Sig: Take 1 tablet by mouth daily for 7 days     Dispense:  7 tablet     Refill:  0      Appointment in 3 months. Tammy Nunez am scribing for, and in the presence of Dr. Kole Acevedo. Electronically signed by: Rocio Villeda 9/13/21 10:21 AM       I agree to the above documentation placed by my scribe Rocio Villeda. I reviewed the scribe's note and agree with the documented findings and plan of care. Any areas of disagreement are noted on the chart. I have personally evaluated this patient. Additional findings are as noted. I agree with the chief complaint, past medical history, past surgical history, allergies, medications, social and family history as documented unless otherwise noted below.      Electronically signed by Kole Acevedo MD on 9/13/2021 at 10:38 AM

## 2021-09-15 ENCOUNTER — OFFICE VISIT (OUTPATIENT)
Dept: FAMILY MEDICINE CLINIC | Age: 41
End: 2021-09-15
Payer: COMMERCIAL

## 2021-09-15 VITALS
SYSTOLIC BLOOD PRESSURE: 112 MMHG | HEIGHT: 62 IN | RESPIRATION RATE: 20 BRPM | WEIGHT: 154 LBS | BODY MASS INDEX: 28.34 KG/M2 | OXYGEN SATURATION: 98 % | DIASTOLIC BLOOD PRESSURE: 72 MMHG | HEART RATE: 81 BPM

## 2021-09-15 DIAGNOSIS — J45.20 MILD INTERMITTENT ASTHMA WITHOUT COMPLICATION: ICD-10-CM

## 2021-09-15 DIAGNOSIS — G89.29 CHRONIC NONINTRACTABLE HEADACHE, UNSPECIFIED HEADACHE TYPE: ICD-10-CM

## 2021-09-15 DIAGNOSIS — R10.31 RLQ ABDOMINAL PAIN: ICD-10-CM

## 2021-09-15 DIAGNOSIS — H92.01 RIGHT EAR PAIN: ICD-10-CM

## 2021-09-15 DIAGNOSIS — R51.9 RIGHT SIDED FACIAL PAIN: ICD-10-CM

## 2021-09-15 DIAGNOSIS — Z00.00 ANNUAL VISIT FOR GENERAL ADULT MEDICAL EXAMINATION WITHOUT ABNORMAL FINDINGS: Primary | ICD-10-CM

## 2021-09-15 DIAGNOSIS — M79.621 PAIN IN RIGHT AXILLA: ICD-10-CM

## 2021-09-15 DIAGNOSIS — R51.9 CHRONIC NONINTRACTABLE HEADACHE, UNSPECIFIED HEADACHE TYPE: ICD-10-CM

## 2021-09-15 DIAGNOSIS — K59.00 CONSTIPATION, UNSPECIFIED CONSTIPATION TYPE: ICD-10-CM

## 2021-09-15 PROCEDURE — 99396 PREV VISIT EST AGE 40-64: CPT | Performed by: NURSE PRACTITIONER

## 2021-09-15 RX ORDER — ALBUTEROL SULFATE 90 UG/1
2 AEROSOL, METERED RESPIRATORY (INHALATION) 4 TIMES DAILY PRN
Qty: 54 G | Refills: 1 | Status: SHIPPED | OUTPATIENT
Start: 2021-09-15

## 2021-09-15 SDOH — ECONOMIC STABILITY: FOOD INSECURITY: WITHIN THE PAST 12 MONTHS, THE FOOD YOU BOUGHT JUST DIDN'T LAST AND YOU DIDN'T HAVE MONEY TO GET MORE.: NEVER TRUE

## 2021-09-15 SDOH — ECONOMIC STABILITY: FOOD INSECURITY: WITHIN THE PAST 12 MONTHS, YOU WORRIED THAT YOUR FOOD WOULD RUN OUT BEFORE YOU GOT MONEY TO BUY MORE.: NEVER TRUE

## 2021-09-15 ASSESSMENT — ENCOUNTER SYMPTOMS
DIARRHEA: 0
VOMITING: 0
BACK PAIN: 0
RHINORRHEA: 0
COLOR CHANGE: 0
CHEST TIGHTNESS: 0
SINUS PAIN: 0
SORE THROAT: 0
CONSTIPATION: 0
SINUS PRESSURE: 0
SHORTNESS OF BREATH: 0
TROUBLE SWALLOWING: 0
ABDOMINAL PAIN: 0
EYE PAIN: 0
NAUSEA: 0
FACIAL SWELLING: 0

## 2021-09-15 ASSESSMENT — PATIENT HEALTH QUESTIONNAIRE - PHQ9
SUM OF ALL RESPONSES TO PHQ QUESTIONS 1-9: 0
SUM OF ALL RESPONSES TO PHQ9 QUESTIONS 1 & 2: 0
SUM OF ALL RESPONSES TO PHQ QUESTIONS 1-9: 0
SUM OF ALL RESPONSES TO PHQ QUESTIONS 1-9: 0
1. LITTLE INTEREST OR PLEASURE IN DOING THINGS: 0
2. FEELING DOWN, DEPRESSED OR HOPELESS: 0

## 2021-09-15 ASSESSMENT — SOCIAL DETERMINANTS OF HEALTH (SDOH): HOW HARD IS IT FOR YOU TO PAY FOR THE VERY BASICS LIKE FOOD, HOUSING, MEDICAL CARE, AND HEATING?: NOT HARD AT ALL

## 2021-09-15 NOTE — PROGRESS NOTES
photophobia/vision changes with HA  Would like to meet with neurologist to discuss these chronic/longterm headaches  Also reports right sided facial pain that does not seem to correlate with headaches, occurs when she is laying on her right side especially. Feels it is mostly located in right ear and radiates into cheek. Rates facial pain 7-8/10 at its worst. Typically goes away on its own. Asthma: uses NARINDER with weather changes  Not on any controller inhalers    Allergies: using OTC Allegra, works well  Reports right ear itching pain for several years     Depression/Anxiety: Has tried medication in the past does not like how it makes her feel  Declines Rx at this time, declines counseling at this time  Denies thoughts of hurting self/others    Had recent liposuction around abdomen in [address] last month - not very happy with results    Labs up to date in care everywhere  She would like to update pneumonia vaccine/flu vaccine at a later date  Denies any other problems/concerns at this time         Current Outpatient Medications   Medication Sig Dispense Refill    albuterol sulfate HFA (VENTOLIN HFA) 108 (90 Base) MCG/ACT inhaler Inhale 2 puffs into the lungs 4 times daily as needed for Wheezing or Shortness of Breath 54 g 1     No current facility-administered medications for this visit. Past Medical History:   Diagnosis Date    Mild intermittent asthma without complication     Mild intermittent asthma without complication 4/27/7497      Past Surgical History:   Procedure Laterality Date    HYSTERECTOMY       History reviewed. No pertinent family history.   Social History     Tobacco Use    Smoking status: Current Some Day Smoker     Packs/day: 0.50    Smokeless tobacco: Never Used   Substance Use Topics    Alcohol use: No     Alcohol/week: 0.0 standard drinks     Comment: social      No Known Allergies    Subjective:      Review of Systems   Constitutional: Negative for activity change, chills, fatigue and unexpected weight change. HENT: Positive for ear pain. Negative for congestion, ear discharge, facial swelling, hearing loss, postnasal drip, rhinorrhea, sinus pressure, sinus pain, sore throat, tinnitus and trouble swallowing. Eyes: Negative for pain and visual disturbance. Respiratory: Negative for chest tightness and shortness of breath. Cardiovascular: Negative for chest pain and palpitations. Gastrointestinal: Negative for abdominal pain, constipation, diarrhea, nausea and vomiting. Endocrine: Negative for polydipsia, polyphagia and polyuria. Genitourinary: Negative for dysuria, flank pain, frequency and urgency. Musculoskeletal: Negative for arthralgias, back pain and myalgias. Skin: Negative for color change and rash. Lesion to right axilla  Lesion to RLQ abdomen     Allergic/Immunologic: Positive for environmental allergies. Negative for food allergies. Neurological: Negative for dizziness, weakness, light-headedness, numbness and headaches. Psychiatric/Behavioral: Positive for sleep disturbance. Negative for confusion, hallucinations, self-injury and suicidal ideas. The patient is nervous/anxious. Other pertinent ROS in HPI  Objective:     /72   Pulse 81   Resp 20   Ht 5' 2\" (1.575 m)   Wt 154 lb (69.9 kg)   LMP  (LMP Unknown)   SpO2 98%   BMI 28.17 kg/m²        Physical Exam  Vitals reviewed. Constitutional:       Appearance: Normal appearance. HENT:      Head: Normocephalic. Right Ear: Hearing, ear canal and external ear normal. No decreased hearing noted. No drainage, swelling or tenderness. No middle ear effusion. There is no impacted cerumen. Tympanic membrane is scarred. Tympanic membrane is not perforated, erythematous, retracted or bulging. Left Ear: Hearing, tympanic membrane, ear canal and external ear normal. No decreased hearing noted. No drainage, swelling or tenderness. No middle ear effusion.  There is no impacted cerumen. Tympanic membrane is not scarred, perforated, erythematous, retracted or bulging. Ears:      Comments: Right ear: Dark scar like tissue noted surrounding TM - denies hx of tubes, perforated ear drum     Nose: Nose normal.      Mouth/Throat:      Mouth: Mucous membranes are moist.      Pharynx: Oropharynx is clear. Eyes:      Extraocular Movements: Extraocular movements intact. Conjunctiva/sclera: Conjunctivae normal.      Pupils: Pupils are equal, round, and reactive to light. Cardiovascular:      Rate and Rhythm: Normal rate and regular rhythm. Pulses: Normal pulses. Heart sounds: Normal heart sounds. Pulmonary:      Effort: Pulmonary effort is normal.      Breath sounds: Normal breath sounds. Abdominal:      General: Abdomen is flat. Bowel sounds are normal.      Palpations: Abdomen is soft. Genitourinary:     Rectum: Normal.   Musculoskeletal:         General: Normal range of motion. Cervical back: Normal range of motion. Skin:     General: Skin is warm and dry. Capillary Refill: Capillary refill takes less than 2 seconds. Neurological:      General: No focal deficit present. Mental Status: She is alert and oriented to person, place, and time. Mental status is at baseline. Psychiatric:         Mood and Affect: Mood normal.         Behavior: Behavior normal.         Thought Content: Thought content normal.         Judgment: Judgment normal.         Lab Review   Hospital Outpatient Visit on 06/11/2021   Component Date Value    Specimen Description 06/11/2021 . VAGINA     Special Requests 06/11/2021 NOT REPORTED     Direct Exam 06/11/2021 NEGATIVE for Candida sp.      Direct Exam 06/11/2021 NEGATIVE for Trichomonas vaginalis     Direct Exam 06/11/2021 NEGATIVE for Gardnerella vaginalis     Direct Exam 06/11/2021 Method of testing is a DNA probe intended for detection and identification of Candida species, Gardnerella vaginalis, and Trichomonas vaginalis nucleic acid in vaginal fluid specimens from patients with symptoms of vaginitis/vaginosis.  Specimen Description 06/11/2021 . CERVIX     C. trachomatis DNA 06/11/2021 NEGATIVE     N. gonorrhoeae DNA 06/11/2021 NEGATIVE    Office Visit on 06/11/2021   Component Date Value    Color, UA 06/11/2021 yellow     Clarity, UA 06/11/2021 clear     Glucose, UA POC 06/11/2021 neg     Bilirubin, UA 06/11/2021 neg     Ketones, UA 06/11/2021 small     Spec Grav, UA 06/11/2021 1.010     Blood, UA POC 06/11/2021 neg     pH, UA 06/11/2021 5.0     Protein, UA POC 06/11/2021 neg     Urobilinogen, UA 06/11/2021 neg     Leukocytes, UA 06/11/2021 small     Nitrite, UA 06/11/2021 neg    Hospital Outpatient Visit on 05/13/2021   Component Date Value    Specimen Description 05/13/2021 . VAGINA     Special Requests 05/13/2021 NOT REPORTED     Direct Exam 05/13/2021 POSITIVE for Gardnerella vaginalis. *    Direct Exam 05/13/2021 POSITIVE for Candida sp. *    Direct Exam 05/13/2021 NEGATIVE for Trichomonas vaginalis     Direct Exam 05/13/2021 Method of testing is a DNA probe intended for detection and identification of Candida species, Gardnerella vaginalis, and Trichomonas vaginalis nucleic acid in vaginal fluid specimens from patients with symptoms of vaginitis/vaginosis. Office Visit on 05/13/2021   Component Date Value    Color, UA 05/13/2021 pale yellow     Clarity, UA 05/13/2021 clear     Glucose, UA POC 05/13/2021 neg     Bilirubin, UA 05/13/2021 neg     Ketones, UA 05/13/2021 moderate     Spec Grav, UA 05/13/2021 1.015     Blood, UA POC 05/13/2021 neg     pH, UA 05/13/2021 5.0     Protein, UA POC 05/13/2021 neg     Urobilinogen, UA 05/13/2021 norm     Leukocytes, UA 05/13/2021 small     Nitrite, UA 05/13/2021 neg      Assessment/PLAN   1. Annual visit for general adult medical examination without abnormal findings  2. Constipation, unspecified constipation type  3.  Pain in right axilla  - US SOFT TISSUE LIMITED AREA; Future  4. RLQ abdominal pain  -     US SOFT TISSUE LIMITED AREA; Future  5. Mild intermittent asthma without complication  -     albuterol sulfate HFA (VENTOLIN HFA) 108 (90 Base) MCG/ACT inhaler; Inhale 2 puffs into the lungs 4 times daily as needed for Wheezing or Shortness of Breath, Disp-54 g, R-1Normal  6. Chronic nonintractable headache, unspecified headache type  -     Abeba Novoa MD, Neurology, [facility]  7. Right ear pain  -     AFL - Rosemary Romano MD, Otolaryngology, [facility]  8. Right sided facial pain  -     Abeba Novoa MD, Neurology, Franco Greenwood MD, Otolaryngology, [facility]     1. Health maintenance: Rx for mammogram reprinted with scheduling instructions. Reviewed labs completed 06/2021 in care everywhere. Update pneummonia/flu shot when able to. Follow up in 6 months. 2. Right axilla lesion: Rx for ultrasound, follow up pending results. 3. RLQ abdominal pain: Rx for ultrasound, follow up pending results. 4. Asthma: Well controlled with prn use of NARINDER. Follow up in 6 months. 5. HA: Rx for referral to neurology. Discussed use of OTC Excedrin for tension migraine as needed. Continue to drink plenty of fluids and rest.  If headache becomes severe, vision changes, nausea/vomiting occur please follow up in office for further evaluation or seek emergent medical care. 6. Right ear pain/Right sided facial pain: Strongly recommend dental exam to rule out referred pain as she has not seen a dentist for several decades. Rx for follow up with for ear pain. Follow up as needed. 7. Constipation: Discussed conservative measures such as increasing the intake of fiber, drink at least 64 oz of water daily and get your body moving. Informational material provided in both english/Croatian in AVS.  Follow up as needed.       RTO if symptoms worsen or fail to improve  Pt agreeable with plan      Patient given educational materials - see patientinstructions. Discussed use, benefit, and side effects of prescribed medications. All patient questions answered. Pt voiced understanding. Reviewed health maintenance. Instructed to continue current medications, diet andexercise. 1.  Kang received counseling on the following healthy behaviors: nutrition, exercise and medication adherence  2. Patient given educationalmaterials when available - see patient instructions when applicable  3. Discussed use, benefit, and side effects of prescribed medications. Barriersto medication compliance addressed. All patient questions answered. Pt voiced understanding. 4.  Reviewed prior labs and health maintenance when applicable. 5.  Continue current medications, diet and exercise. CompletedRefills   Requested Prescriptions     Signed Prescriptions Disp Refills    albuterol sulfate HFA (VENTOLIN HFA) 108 (90 Base) MCG/ACT inhaler 54 g 1     Sig: Inhale 2 puffs into the lungs 4 times daily as needed for Wheezing or Shortness of Breath       This note was transcribed using dictation with Dragon services. Efforts were made to correct any errors but some words may be misinterpreted.     Electronically signed by SHANNON Pimentel NP, CNP on 9/15/2021 at 11:09 AM

## 2021-09-16 DIAGNOSIS — R10.31 RLQ ABDOMINAL PAIN: ICD-10-CM

## 2021-09-16 DIAGNOSIS — M79.621 PAIN IN RIGHT AXILLA: Primary | ICD-10-CM

## 2021-09-20 ENCOUNTER — TELEPHONE (OUTPATIENT)
Dept: FAMILY MEDICINE CLINIC | Age: 41
End: 2021-09-20

## 2021-09-20 DIAGNOSIS — M79.621 PAIN IN RIGHT AXILLA: Primary | ICD-10-CM

## 2021-09-20 NOTE — TELEPHONE ENCOUNTER
Received a call from Keerthi Tariq with [phone]  They need an order for Diagnostic Bilateral Mammogram.     Best to come from you being you ordered the Ultrasound too

## 2021-10-06 ENCOUNTER — HOSPITAL ENCOUNTER (OUTPATIENT)
Dept: ULTRASOUND IMAGING | Age: 41
Discharge: HOME | End: 2021-10-08
Payer: COMMERCIAL

## 2021-10-06 ENCOUNTER — HOSPITAL ENCOUNTER (OUTPATIENT)
Dept: MAMMOGRAPHY | Age: 41
Discharge: HOME | End: 2021-10-08
Payer: COMMERCIAL

## 2021-10-06 DIAGNOSIS — M79.621 PAIN IN RIGHT AXILLA: Primary | ICD-10-CM

## 2021-10-06 DIAGNOSIS — M79.621 PAIN IN RIGHT AXILLA: ICD-10-CM

## 2021-10-06 PROCEDURE — G0279 TOMOSYNTHESIS, MAMMO: HCPCS

## 2021-10-06 PROCEDURE — 76642 ULTRASOUND BREAST LIMITED: CPT

## 2021-10-28 ENCOUNTER — TELEPHONE (OUTPATIENT)
Dept: OBGYN CLINIC | Age: 41
End: 2021-10-28

## 2021-11-04 ENCOUNTER — OFFICE VISIT (OUTPATIENT)
Dept: OBGYN CLINIC | Age: 41
End: 2021-11-04
Payer: COMMERCIAL

## 2021-11-04 ENCOUNTER — HOSPITAL ENCOUNTER (OUTPATIENT)
Age: 41
Setting detail: SPECIMEN
Discharge: HOME | End: 2021-11-04
Payer: COMMERCIAL

## 2021-11-04 VITALS
HEIGHT: 62 IN | WEIGHT: 156 LBS | DIASTOLIC BLOOD PRESSURE: 82 MMHG | BODY MASS INDEX: 28.71 KG/M2 | SYSTOLIC BLOOD PRESSURE: 116 MMHG | HEART RATE: 82 BPM

## 2021-11-04 DIAGNOSIS — N76.0 ACUTE VAGINITIS: Primary | ICD-10-CM

## 2021-11-04 DIAGNOSIS — N76.0 ACUTE VAGINITIS: ICD-10-CM

## 2021-11-04 PROCEDURE — 99213 OFFICE O/P EST LOW 20 MIN: CPT | Performed by: SPECIALIST

## 2021-11-04 RX ORDER — FLUCONAZOLE 100 MG/1
100 TABLET ORAL DAILY
Qty: 7 TABLET | Refills: 0 | Status: SHIPPED | OUTPATIENT
Start: 2021-11-04 | End: 2021-11-11

## 2021-11-04 RX ORDER — ESTRADIOL 0.04 MG/D
FILM, EXTENDED RELEASE TRANSDERMAL
COMMUNITY
Start: 2021-10-05 | End: 2022-09-04

## 2021-11-04 RX ORDER — METRONIDAZOLE 500 MG/1
500 TABLET ORAL 2 TIMES DAILY
Qty: 14 TABLET | Refills: 0 | Status: SHIPPED | OUTPATIENT
Start: 2021-11-04 | End: 2021-11-11

## 2021-11-04 ASSESSMENT — ENCOUNTER SYMPTOMS
DIARRHEA: 0
ABDOMINAL PAIN: 0
ABDOMINAL DISTENTION: 0
EYE PAIN: 0
COUGH: 0
CONSTIPATION: 0
NAUSEA: 0
VOMITING: 0
APNEA: 0

## 2021-11-04 NOTE — PROGRESS NOTES
Subjective:      Patient ID: Toya Tracey is a 42 y.o. female. Chief Complaint   Patient presents with    Vaginal Discharge     /82 (Site: Left Upper Arm, Position: Sitting, Cuff Size: Medium Adult)   Pulse 82   Ht 5' 2\" (1.575 m)   Wt 156 lb (70.8 kg)   LMP  (LMP Unknown)   Breastfeeding No   BMI 28.53 kg/m²   No LMP recorded (lmp unknown). Patient has had a hysterectomy. No obstetric history on file. Past Medical History:   Diagnosis Date    Mild intermittent asthma without complication     Mild intermittent asthma without complication 4/24/3661     Current Outpatient Medications Ordered in Epic   Medication Sig Dispense Refill    estradiol (VIVELLE) 0.0375 MG/24HR APPLY 1 PATCH EXTERNALLY TO THE SKIN 2 TIMES A WEEK      metroNIDAZOLE (FLAGYL) 500 MG tablet Take 1 tablet by mouth 2 times daily for 7 days 14 tablet 0    fluconazole (DIFLUCAN) 100 MG tablet Take 1 tablet by mouth daily for 7 days 7 tablet 0    albuterol sulfate HFA (VENTOLIN HFA) 108 (90 Base) MCG/ACT inhaler Inhale 2 puffs into the lungs 4 times daily as needed for Wheezing or Shortness of Breath 54 g 1     No current [facility]-ordered facility-administered medications on file. Problem List Items Addressed This Visit     Acute vaginitis - Primary    Relevant Orders    VAGINITIS DNA PROBE    C.trachomatis N.gonorrhoeae DNA        No Known Allergies  Orders Placed This Encounter   Procedures    VAGINITIS DNA PROBE     Standing Status:   Future     Standing Expiration Date:   11/4/2022    C.trachomatis N.gonorrhoeae DNA     Standing Status:   Future     Standing Expiration Date:   11/4/2022        Patient is here today because she has a vaginal discharge with odor. Patient has had a hysterectomy. Review of Systems   Constitutional: Negative for activity change, appetite change and fever. HENT: Negative for ear discharge and ear pain. Eyes: Negative for pain and visual disturbance.    Respiratory: Negative for apnea and cough. Cardiovascular: Negative for chest pain, palpitations and leg swelling. Gastrointestinal: Negative for abdominal distention, abdominal pain, constipation, diarrhea, nausea and vomiting. Endocrine: Negative. Genitourinary: Positive for vaginal discharge. Negative for difficulty urinating, dysuria, menstrual problem and pelvic pain. Musculoskeletal: Negative for neck pain and neck stiffness. Skin: Negative. Neurological: Negative for light-headedness and numbness. Hematological: Negative. Does not bruise/bleed easily. Objective:   Physical Exam  Vitals and nursing note reviewed. Exam conducted with a chaperone present. Constitutional:       Appearance: She is well-developed. HENT:      Head: Normocephalic and atraumatic. Neck:      Thyroid: No thyromegaly. Cardiovascular:      Rate and Rhythm: Normal rate and regular rhythm. Pulmonary:      Effort: Pulmonary effort is normal.      Breath sounds: Normal breath sounds. No wheezing. Abdominal:      General: Bowel sounds are normal. There is no distension. Palpations: Abdomen is soft. There is no mass. Tenderness: There is no abdominal tenderness. There is no guarding. Genitourinary:     Vagina: Vaginal discharge present. Uterus: Absent. Musculoskeletal:         General: Normal range of motion. Cervical back: Normal range of motion and neck supple. Skin:     General: Skin is dry. Neurological:      Mental Status: She is alert and oriented to person, place, and time. Psychiatric:         Behavior: Behavior normal.         Thought Content: Thought content normal.         Assessment:      Patient with vaginitis. Cultures were done. Will treat with Flagyl and Diflucan. Explained to patient that she does need annual exams, however annual pap smears are not recommended by ASCCP guidelines due to her hysterectomy and history of normal pap smears.       Plan:      Orders Placed This Encounter Procedures    VAGINITIS DNA PROBE    C.trachomatis N.gonorrhoeae DNA     Orders Placed This Encounter   Medications    metroNIDAZOLE (FLAGYL) 500 MG tablet     Sig: Take 1 tablet by mouth 2 times daily for 7 days     Dispense:  14 tablet     Refill:  0    fluconazole (DIFLUCAN) 100 MG tablet     Sig: Take 1 tablet by mouth daily for 7 days     Dispense:  7 tablet     Refill:  0      Appointment for annual exam.    IDamaris am scribing for, and in the presence of Dr. Kranthi Rodriguez.    Electronically signed by: Damaris Pike 11/4/21 2:22 PM

## 2021-11-05 LAB
DIRECT EXAM: ABNORMAL
Lab: ABNORMAL
SPECIMEN DESCRIPTION: ABNORMAL

## 2021-12-15 ENCOUNTER — TELEPHONE (OUTPATIENT)
Dept: OBGYN CLINIC | Age: 41
End: 2021-12-15

## 2021-12-15 NOTE — TELEPHONE ENCOUNTER
Patient is calling because she has a vaginal discharge, itching and odor. Can we please call in something.   Thanks

## 2021-12-16 RX ORDER — FLUCONAZOLE 100 MG/1
100 TABLET ORAL DAILY
Qty: 7 TABLET | Refills: 0 | Status: SHIPPED | OUTPATIENT
Start: 2021-12-16 | End: 2021-12-23

## 2021-12-16 RX ORDER — METRONIDAZOLE 500 MG/1
500 TABLET ORAL 2 TIMES DAILY
Qty: 14 TABLET | Refills: 0 | Status: SHIPPED | OUTPATIENT
Start: 2021-12-16 | End: 2021-12-23

## 2022-08-17 ENCOUNTER — NURSE TRIAGE (OUTPATIENT)
Dept: OTHER | Facility: CLINIC | Age: 42
End: 2022-08-17

## 2022-08-17 ENCOUNTER — TELEPHONE (OUTPATIENT)
Dept: FAMILY MEDICINE CLINIC | Age: 42
End: 2022-08-17

## 2022-08-17 NOTE — TELEPHONE ENCOUNTER
Received call from nurse triage, stating pt had been stung by about 10 bees about 3 days ago, pt states she us having a headache and night sweating with SOB  I spoke with [employer] asked her to review nurse triage call, [employer] advised for pt to go back to the ER, pt was informed, pt states she will go back to the ER

## 2022-08-17 NOTE — TELEPHONE ENCOUNTER
Received call from [address] at [facility] Subjective: Caller states \"I have a headache\"     Current Symptoms: Stung by bees on her bottom, back, finger, feet and leg, approx 10 times, 3 days ago. Went to ER for inflammation and was given Benadryl and a steroid thru IV. Pain is in back of her head, close to her neck. Sweating last night. No known allergy to bees. Dizzy. SOB when laying down. No face, lip or tongue swelling. Itching coming and going. Onset: 3 days ago; worsening    Associated Symptoms: reduced activity, increased wakefulness    Pain Severity: 7-8/10; ; waxing and waning, moderate, severe    Temperature: denies fever     What has been tried: Steroids, Benadryl    LMP: NA Pregnant: NA    Recommended disposition: Go to ED/[facility] Now (Or to Office with PCP Approval)    Care advice provided, patient verbalizes understanding; denies any other questions or concerns; instructed to call back for any new or worsening symptoms. Writer provided warm transfer to Panfilo Montanez at [facility] for second level triage. Attention Provider: Thank you for allowing me to participate in the care of your patient. The patient was connected to triage in response to information provided to the ECC/PSC. Please do not respond through this encounter as the response is not directed to a shared pool.       Reason for Disposition   Patient sounds very sick or weak to the triager    Protocols used: Bee or [place] [employer]-ADULT-[place]

## 2022-09-04 ENCOUNTER — HOSPITAL ENCOUNTER (EMERGENCY)
Age: 42
Discharge: HOME | End: 2022-09-04
Attending: STUDENT IN AN ORGANIZED HEALTH CARE EDUCATION/TRAINING PROGRAM
Payer: COMMERCIAL

## 2022-09-04 VITALS
HEIGHT: 62 IN | HEART RATE: 102 BPM | RESPIRATION RATE: 16 BRPM | OXYGEN SATURATION: 97 % | WEIGHT: 145 LBS | BODY MASS INDEX: 26.68 KG/M2 | SYSTOLIC BLOOD PRESSURE: 135 MMHG | DIASTOLIC BLOOD PRESSURE: 100 MMHG | TEMPERATURE: 98.7 F

## 2022-09-04 DIAGNOSIS — G44.209 TENSION HEADACHE: Primary | ICD-10-CM

## 2022-09-04 PROCEDURE — 99284 EMERGENCY DEPT VISIT MOD MDM: CPT

## 2022-09-04 PROCEDURE — 6360000002 HC RX W HCPCS: Performed by: STUDENT IN AN ORGANIZED HEALTH CARE EDUCATION/TRAINING PROGRAM

## 2022-09-04 PROCEDURE — 96372 THER/PROPH/DIAG INJ SC/IM: CPT

## 2022-09-04 RX ORDER — BUTALBITAL, ACETAMINOPHEN AND CAFFEINE 50; 325; 40 MG/1; MG/1; MG/1
1 TABLET ORAL EVERY 4 HOURS PRN
Qty: 12 TABLET | Refills: 0 | Status: SHIPPED | OUTPATIENT
Start: 2022-09-04

## 2022-09-04 RX ORDER — KETOROLAC TROMETHAMINE 30 MG/ML
30 INJECTION, SOLUTION INTRAMUSCULAR; INTRAVENOUS ONCE
Status: COMPLETED | OUTPATIENT
Start: 2022-09-04 | End: 2022-09-04

## 2022-09-04 RX ORDER — METOCLOPRAMIDE HYDROCHLORIDE 5 MG/ML
10 INJECTION INTRAMUSCULAR; INTRAVENOUS ONCE
Status: COMPLETED | OUTPATIENT
Start: 2022-09-04 | End: 2022-09-04

## 2022-09-04 RX ORDER — CEPHALEXIN 500 MG/1
500 CAPSULE ORAL 4 TIMES DAILY
Qty: 28 CAPSULE | Refills: 0 | Status: SHIPPED | OUTPATIENT
Start: 2022-09-04 | End: 2022-09-11

## 2022-09-04 RX ORDER — DIPHENHYDRAMINE HYDROCHLORIDE 50 MG/ML
25 INJECTION INTRAMUSCULAR; INTRAVENOUS ONCE
Status: COMPLETED | OUTPATIENT
Start: 2022-09-04 | End: 2022-09-04

## 2022-09-04 RX ADMIN — KETOROLAC TROMETHAMINE 30 MG: 30 INJECTION, SOLUTION INTRAMUSCULAR at 03:22

## 2022-09-04 RX ADMIN — METOCLOPRAMIDE 10 MG: 5 INJECTION, SOLUTION INTRAMUSCULAR; INTRAVENOUS at 03:22

## 2022-09-04 RX ADMIN — DIPHENHYDRAMINE HYDROCHLORIDE 25 MG: 50 INJECTION, SOLUTION INTRAMUSCULAR; INTRAVENOUS at 03:22

## 2022-09-04 ASSESSMENT — PAIN - FUNCTIONAL ASSESSMENT: PAIN_FUNCTIONAL_ASSESSMENT: 0-10

## 2022-09-04 ASSESSMENT — ENCOUNTER SYMPTOMS
EYE DISCHARGE: 0
COLOR CHANGE: 0
ABDOMINAL PAIN: 0
EYE REDNESS: 0
SHORTNESS OF BREATH: 0

## 2022-09-04 ASSESSMENT — PAIN SCALES - GENERAL
PAINLEVEL_OUTOF10: 9
PAINLEVEL_OUTOF10: 9

## 2022-09-04 NOTE — ED PROVIDER NOTES
[facility]  Emergency Department Encounter     Pt Name: Trent Stanford  MRN: [id number]  [place] 1980  Date of evaluation: 9/4/22  PCP:  SHANNON Stevenson NP    CHIEF COMPLAINT       Chief Complaint   Patient presents with    Headache     X2-3 days    Other     Concerned her tattoo is infected. 3weeks old       HISTORY OFPRESENT ILLNESS  (Location/Symptom, Timing/Onset, Context/Setting, Quality, Duration, Modifying Factors,Severity.)      Trent Stanford is a 36 y.o. female who presents with reported headache. This been ongoing for the past 2 to 3 days. Gradually worsening. Left-sided neck and left-sided head. Does endorse previous history of migraines. Pain is moderate. Worsening. PAST MEDICAL / SURGICAL / SOCIAL / FAMILY HISTORY      has a past medical history of Mild intermittent asthma without complication and Mild intermittent asthma without complication. has a past surgical history that includes Hysterectomy.     Social History     Socioeconomic History    Marital status:      Spouse name: Not on file    Number of children: Not on file    Years of education: Not on file    Highest education level: Not on file   Occupational History    Not on file   Tobacco Use    Smoking status: Some Days     Packs/day: 0.50     Types: Cigarettes    Smokeless tobacco: Never   Substance and Sexual Activity    Alcohol use: No     Alcohol/week: 0.0 standard drinks     Comment: social    Drug use: No    Sexual activity: Never     Partners: Male   Other Topics Concern    Not on file   Social History Narrative    Not on file     Social Determinants of Health     Financial Resource Strain: Low Risk     Difficulty of Paying Living Expenses: Not hard at all   Food Insecurity: No Food Insecurity    Worried About Running Out of Food in the Last Year: Never true    Ran Out of Food in the Last Year: Never true   Transportation Needs: Not on file   Physical Activity: Not on file Stress: Not on file   Social Connections: Not on file   Intimate Partner Violence: Not on file   Housing Stability: Not on file       History reviewed. No pertinent family history. Allergies:  Patient has no known allergies. Home Medications:  Prior to Admission medications    Medication Sig Start Date End Date Taking? Authorizing Provider   butalbital-acetaminophen-caffeine (FIORICET, ESGIC) -36 MG per tablet Take 1 tablet by mouth every 4 hours as needed for Headaches 9/4/22  Yes Narciso Carr, DO   cephALEXin (KEFLEX) 500 MG capsule Take 1 capsule by mouth 4 times daily for 7 days 9/4/22 9/11/22 Yes Narciso Carr, DO   albuterol sulfate HFA (VENTOLIN HFA) 108 (90 Base) MCG/ACT inhaler Inhale 2 puffs into the lungs 4 times daily as needed for Wheezing or Shortness of Breath 9/15/21   SHANNON Salmeron - NP       REVIEW OF SYSTEMS    (2-9 systems for level 4, 10 or more for level 5)      Review of Systems   Constitutional:  Negative for chills and fever. Eyes:  Negative for discharge and redness. Respiratory:  Negative for shortness of breath. Cardiovascular:  Negative for chest pain. Gastrointestinal:  Negative for abdominal pain. Endocrine: Negative for polyuria. Genitourinary:  Negative for flank pain. Musculoskeletal:  Negative for myalgias. Skin:  Negative for color change and rash. Allergic/Immunologic: Negative for environmental allergies. Neurological:  Positive for headaches. Psychiatric/Behavioral:  Negative for agitation and confusion. PHYSICAL EXAM   (up to 7 for level 4, 8 or more for level 5)     INITIAL VITALS:    height is 5' 2\" (1.575 m) and weight is 145 lb (65.8 kg). Her temperature is 98.7 °F (37.1 °C). Her blood pressure is 135/100 (abnormal) and her pulse is 102 (abnormal). Her respiration is 16 and oxygen saturation is 97%. Physical Exam  Vitals and nursing note reviewed. Constitutional:       Appearance: She is well-developed.    HENT: Head: Normocephalic and atraumatic. Nose: Nose normal.      Mouth/Throat:      Mouth: Mucous membranes are moist.   Eyes:      General: No scleral icterus. Conjunctiva/sclera: Conjunctivae normal.      Pupils: Pupils are equal, round, and reactive to light. Cardiovascular:      Rate and Rhythm: Normal rate and regular rhythm. Heart sounds: Normal heart sounds. No murmur heard. No friction rub. No gallop. Pulmonary:      Effort: Pulmonary effort is normal. No respiratory distress. Breath sounds: Normal breath sounds. No wheezing or rales. Musculoskeletal:         General: Normal range of motion. Skin:     General: Skin is warm and dry. Findings: No erythema or rash. Comments: Healing tattoo to right upper extremity, no surrounding erythema, no palpable fluctuance or induration   Neurological:      Mental Status: She is alert and oriented to person, place, and time. Comments: No facial asymmetry, no aphasia, no dysarthria, EOMI, PERRLA; 5/5 strength in upper and lower extremities b/l   Psychiatric:         Behavior: Behavior normal.       DIFFERENTIAL  DIAGNOSIS     PLAN (LABS / IMAGING / EKG):  No orders of the defined types were placed in this encounter. MEDICATIONS ORDERED:  Orders Placed This Encounter   Medications    ketorolac (TORADOL) injection 30 mg    metoclopramide (REGLAN) injection 10 mg    diphenhydrAMINE (BENADRYL) injection 25 mg    butalbital-acetaminophen-caffeine (FIORICET, ESGIC) -40 MG per tablet     Sig: Take 1 tablet by mouth every 4 hours as needed for Headaches     Dispense:  12 tablet     Refill:  0    cephALEXin (KEFLEX) 500 MG capsule     Sig: Take 1 capsule by mouth 4 times daily for 7 days     Dispense:  28 capsule     Refill:  0       DDX: Migraine versus skin infection versus sinusitis    Initial MDM/Plan: 36 y.o. female who presents with headache and concern for tattoo infection. Clinically tattoo appears to be healing well. taking these medications    Details   butalbital-acetaminophen-caffeine (FIORICET, ESGIC) -40 MG per tablet Take 1 tablet by mouth every 4 hours as needed for Headaches, Disp-12 tablet, R-0Print      cephALEXin (KEFLEX) 500 MG capsule Take 1 capsule by mouth 4 times daily for 7 days, Disp-28 capsule, R-0Print             Rosmery Lo DO  EmergencyMedicine Attending    (Please note that portions of this note were completed with a voice recognition program.  Efforts were made to edit the dictations but occasionally words are mis-transcribed.)       Rosmery Lo DO  09/04/22 [id number]

## 2022-09-06 ENCOUNTER — TELEPHONE (OUTPATIENT)
Dept: FAMILY MEDICINE CLINIC | Age: 42
End: 2022-09-06

## 2022-09-06 DIAGNOSIS — G44.229 CHRONIC TENSION-TYPE HEADACHE, NOT INTRACTABLE: Primary | ICD-10-CM

## 2022-09-06 NOTE — TELEPHONE ENCOUNTER
Patient called asking for referral to neurologist for her headaches.  She stated she went to the ER a few days ago and was given a shot, but the headache is back

## 2022-09-07 NOTE — TELEPHONE ENCOUNTER
Referral placed, please mail.     Dr. Nano Pierce  [address] [place] [address] [address] [place] [address]  [phone]

## 2022-09-08 ENCOUNTER — TELEPHONE (OUTPATIENT)
Dept: FAMILY MEDICINE CLINIC | Age: 42
End: 2022-09-08

## 2022-09-08 NOTE — TELEPHONE ENCOUNTER
I suggest she call insurance and see who is in network. Unfortunately the [facility]ists are also 1-2 months out in seeing new patients as well. I think Robby Sandhu out of [place] has been getting patients in fairly quickly so if she can go there. ..

## 2022-09-08 NOTE — TELEPHONE ENCOUNTER
Patient called stating she called Dr. Thien Santoro and they are not able to get her in for 2 months. Please advise.

## 2022-09-13 ENCOUNTER — HOSPITAL ENCOUNTER (OUTPATIENT)
Age: 42
Discharge: HOME | End: 2022-09-13
Payer: COMMERCIAL

## 2022-09-13 ENCOUNTER — TRANSCRIBE ORDERS (OUTPATIENT)
Dept: ADMINISTRATIVE | Age: 42
End: 2022-09-13

## 2022-09-13 DIAGNOSIS — K43.9 EPIGASTRIC HERNIA: Primary | ICD-10-CM

## 2022-09-13 LAB
EKG ATRIAL RATE: 68 BPM
EKG P AXIS: 50 DEGREES
EKG P-R INTERVAL: 150 MS
EKG Q-T INTERVAL: 414 MS
EKG QRS DURATION: 66 MS
EKG QTC CALCULATION (BAZETT): 440 MS
EKG R AXIS: 67 DEGREES
EKG T AXIS: 54 DEGREES
EKG VENTRICULAR RATE: 68 BPM

## 2022-09-13 PROCEDURE — 93005 ELECTROCARDIOGRAM TRACING: CPT | Performed by: PEDIATRICS

## 2022-09-13 PROCEDURE — 93010 ELECTROCARDIOGRAM REPORT: CPT | Performed by: INTERNAL MEDICINE

## 2022-09-20 ENCOUNTER — HOSPITAL ENCOUNTER (OUTPATIENT)
Dept: ULTRASOUND IMAGING | Age: 42
Discharge: HOME | End: 2022-09-22

## 2022-09-20 DIAGNOSIS — K43.9 EPIGASTRIC HERNIA: ICD-10-CM

## 2022-09-20 PROCEDURE — 76705 ECHO EXAM OF ABDOMEN: CPT

## 2023-01-18 ENCOUNTER — OFFICE VISIT (OUTPATIENT)
Dept: OBGYN CLINIC | Age: 43
End: 2023-01-18

## 2023-01-18 ENCOUNTER — HOSPITAL ENCOUNTER (OUTPATIENT)
Age: 43
Setting detail: SPECIMEN
Discharge: HOME | End: 2023-01-18

## 2023-01-18 VITALS
DIASTOLIC BLOOD PRESSURE: 82 MMHG | HEIGHT: 62 IN | WEIGHT: 146 LBS | SYSTOLIC BLOOD PRESSURE: 110 MMHG | HEART RATE: 81 BPM | BODY MASS INDEX: 26.87 KG/M2

## 2023-01-18 DIAGNOSIS — Z12.31 SCREENING MAMMOGRAM, ENCOUNTER FOR: ICD-10-CM

## 2023-01-18 DIAGNOSIS — N94.9 VAGINAL BURNING: ICD-10-CM

## 2023-01-18 DIAGNOSIS — N76.0 ACUTE VAGINITIS: ICD-10-CM

## 2023-01-18 DIAGNOSIS — Z01.419 WELL WOMAN EXAM WITH ROUTINE GYNECOLOGICAL EXAM: Primary | ICD-10-CM

## 2023-01-18 PROCEDURE — 99396 PREV VISIT EST AGE 40-64: CPT | Performed by: SPECIALIST

## 2023-01-18 RX ORDER — FLUCONAZOLE 100 MG/1
100 TABLET ORAL DAILY
Qty: 7 TABLET | Refills: 0 | Status: SHIPPED | OUTPATIENT
Start: 2023-01-18 | End: 2023-01-25

## 2023-01-18 RX ORDER — METRONIDAZOLE 500 MG/1
500 TABLET ORAL 2 TIMES DAILY
Qty: 14 TABLET | Refills: 0 | Status: SHIPPED | OUTPATIENT
Start: 2023-01-18 | End: 2023-01-25

## 2023-01-18 SDOH — ECONOMIC STABILITY: FOOD INSECURITY: WITHIN THE PAST 12 MONTHS, YOU WORRIED THAT YOUR FOOD WOULD RUN OUT BEFORE YOU GOT MONEY TO BUY MORE.: NEVER TRUE

## 2023-01-18 SDOH — ECONOMIC STABILITY: FOOD INSECURITY: WITHIN THE PAST 12 MONTHS, THE FOOD YOU BOUGHT JUST DIDN'T LAST AND YOU DIDN'T HAVE MONEY TO GET MORE.: NEVER TRUE

## 2023-01-18 ASSESSMENT — ENCOUNTER SYMPTOMS
COUGH: 0
ABDOMINAL DISTENTION: 0
NAUSEA: 0
CONSTIPATION: 0
APNEA: 0
EYE PAIN: 0
DIARRHEA: 0
VOMITING: 0
ABDOMINAL PAIN: 0

## 2023-01-18 ASSESSMENT — PATIENT HEALTH QUESTIONNAIRE - PHQ9
SUM OF ALL RESPONSES TO PHQ9 QUESTIONS 1 & 2: 1
SUM OF ALL RESPONSES TO PHQ QUESTIONS 1-9: 1
1. LITTLE INTEREST OR PLEASURE IN DOING THINGS: 0
2. FEELING DOWN, DEPRESSED OR HOPELESS: 1
SUM OF ALL RESPONSES TO PHQ QUESTIONS 1-9: 1

## 2023-01-18 ASSESSMENT — SOCIAL DETERMINANTS OF HEALTH (SDOH): HOW HARD IS IT FOR YOU TO PAY FOR THE VERY BASICS LIKE FOOD, HOUSING, MEDICAL CARE, AND HEATING?: NOT HARD AT ALL

## 2023-01-18 NOTE — PROGRESS NOTES
Subjective:      Patient ID: Rm Medina is a 36 y.o. female. Chief Complaint   Patient presents with    Annual Exam       /82 (Site: Right Upper Arm, Position: Sitting)   Pulse 81   Ht 5' 2\" (1.575 m)   Wt 146 lb (66.2 kg)   LMP  (LMP Unknown)   BMI 26.70 kg/m²   No LMP recorded (lmp unknown). Patient has had a hysterectomy. No obstetric history on file. Past Medical History:   Diagnosis Date    Mild intermittent asthma without complication     Mild intermittent asthma without complication 2/42/7955     Current Outpatient Medications Ordered in Epic   Medication Sig Dispense Refill    metroNIDAZOLE (FLAGYL) 500 MG tablet Take 1 tablet by mouth in the morning and 1 tablet in the evening. Do all this for 7 days. 14 tablet 0    fluconazole (DIFLUCAN) 100 MG tablet Take 1 tablet by mouth daily for 7 days 7 tablet 0    albuterol sulfate HFA (VENTOLIN HFA) 108 (90 Base) MCG/ACT inhaler Inhale 2 puffs into the lungs 4 times daily as needed for Wheezing or Shortness of Breath 54 g 1    butalbital-acetaminophen-caffeine (FIORICET, ESGIC) -40 MG per tablet Take 1 tablet by mouth every 4 hours as needed for Headaches (Patient not taking: Reported on 1/18/2023) 12 tablet 0     No current Epic-ordered facility-administered medications on file.      Problem List Items Addressed This Visit       Acute vaginitis    Relevant Orders    C.trachomatis N.gonorrhoeae DNA    Screening mammogram, encounter for - Primary    Relevant Orders    PETRA DIGITAL SCREEN W OR WO CAD BILATERAL    Vaginal burning    Relevant Orders    Vaginitis DNA Probe     No Known Allergies  Orders Placed This Encounter   Procedures    Vaginitis DNA Probe     Standing Status:   Future     Standing Expiration Date:   3/18/2023    C.trachomatis N.gonorrhoeae DNA     Standing Status:   Future     Standing Expiration Date:   1/18/2024    PETRA DIGITAL SCREEN W OR WO CAD BILATERAL     Standing Status:   Future     Standing Expiration Date: 3/18/2024            HPI  Patient is here today for an annual exam.  She has had a hysterectomy. Her last pap smear was 12/29/2020 and was negative for intraepithelial lesion or malignancy and negative for HPV. She complains of vaginal burning today. Review of Systems   Constitutional:  Negative for activity change, appetite change and fever. HENT:  Negative for ear discharge and ear pain. Eyes:  Negative for pain and visual disturbance. Respiratory:  Negative for apnea and cough. Cardiovascular:  Negative for chest pain, palpitations and leg swelling. Gastrointestinal:  Negative for abdominal distention, abdominal pain, constipation, diarrhea, nausea and vomiting. Endocrine: Negative. Genitourinary:  Negative for difficulty urinating, dysuria, menstrual problem and pelvic pain. Musculoskeletal:  Negative for neck pain and neck stiffness. Skin: Negative. Neurological:  Negative for light-headedness and numbness. Hematological: Negative. Does not bruise/bleed easily. Objective:   Physical Exam  Vitals and nursing note reviewed. Exam conducted with a chaperone present. Constitutional:       Appearance: She is well-developed. HENT:      Head: Normocephalic and atraumatic. Neck:      Thyroid: No thyroid mass or thyromegaly. Cardiovascular:      Rate and Rhythm: Normal rate and regular rhythm. Pulmonary:      Effort: Pulmonary effort is normal.      Breath sounds: Normal breath sounds. Chest:   Breasts:     Right: Normal. No inverted nipple, mass, nipple discharge, skin change or tenderness. Left: Normal. No inverted nipple, mass, nipple discharge, skin change or tenderness. Abdominal:      General: Bowel sounds are normal. There is no distension. Palpations: Abdomen is soft. There is no mass. Tenderness: There is no abdominal tenderness. There is no guarding or rebound. Hernia: There is no hernia in the left inguinal area.    Genitourinary: General: Normal vulva. Exam position: Supine. Labia:         Right: No rash or lesion. Left: No rash or lesion. Vagina: No signs of injury. Vaginal discharge present. No tenderness. Uterus: Absent. Adnexa: Right adnexa normal and left adnexa normal.        Right: No mass or tenderness. Left: No mass or tenderness. Rectum: Normal. No mass or anal fissure. Normal anal tone. Musculoskeletal:         General: No tenderness. Normal range of motion. Skin:     General: Skin is warm and dry. Neurological:      Mental Status: She is alert and oriented to person, place, and time. Psychiatric:         Judgment: Judgment normal.       Assessment:      Patient post hysterectomy with vaginitis, otherwise normal annual exam.   Pap smear was not done due to ASCCP guidelines. Cultures were done. Will treat with Flagyl and Diflucan. Patient was advised to have mammogram done. Plan:      Orders Placed This Encounter   Procedures    Vaginitis DNA Probe    C.trachomatis N.gonorrhoeae DNA    PETRA DIGITAL SCREEN W OR WO CAD BILATERAL       Orders Placed This Encounter   Medications    metroNIDAZOLE (FLAGYL) 500 MG tablet     Sig: Take 1 tablet by mouth in the morning and 1 tablet in the evening. Do all this for 7 days. Dispense:  14 tablet     Refill:  0    fluconazole (DIFLUCAN) 100 MG tablet     Sig: Take 1 tablet by mouth daily for 7 days     Dispense:  7 tablet     Refill:  0        Appointment in 1 year    Shahzad THOMAS am scribing for, and in the presence of Dr. Ruthann Barker. Electronically signed by: Shahzad Arevalo 1/18/23 3:22 PM       I agree to the above documentation placed by my scribe Shahzad Arevalo. I reviewed the scribe's note and agree with the documented findings and plan of care. Any areas of disagreement are noted on the chart. I have personally evaluated this patient. Additional findings are as noted.  I agree with the chief complaint, past medical history, past surgical history, allergies, medications, social and family history as documented unless otherwise noted below.      Electronically signed by Raj Null MD on 1/22/2023 at 7:25 AM

## 2023-01-19 LAB
C TRACH DNA GENITAL QL NAA+PROBE: NEGATIVE
CANDIDA SPECIES, DNA PROBE: NEGATIVE
GARDNERELLA VAGINALIS, DNA PROBE: POSITIVE
N. GONORRHOEAE DNA: NEGATIVE
SOURCE: ABNORMAL
SPECIMEN DESCRIPTION: NORMAL
TRICHOMONAS VAGINALIS DNA: NEGATIVE

## 2023-02-02 ENCOUNTER — TELEPHONE (OUTPATIENT)
Dept: OBGYN CLINIC | Age: 43
End: 2023-02-02

## 2023-02-02 RX ORDER — FLUCONAZOLE 100 MG/1
100 TABLET ORAL DAILY
Qty: 7 TABLET | Refills: 0 | Status: SHIPPED | OUTPATIENT
Start: 2023-02-02 | End: 2023-02-09

## 2023-02-02 RX ORDER — METRONIDAZOLE 500 MG/1
500 TABLET ORAL 2 TIMES DAILY
Qty: 14 TABLET | Refills: 0 | Status: SHIPPED | OUTPATIENT
Start: 2023-02-02 | End: 2023-02-09

## 2023-02-17 ENCOUNTER — HOSPITAL ENCOUNTER (EMERGENCY)
Age: 43
Discharge: HOME | End: 2023-02-18
Attending: STUDENT IN AN ORGANIZED HEALTH CARE EDUCATION/TRAINING PROGRAM

## 2023-02-17 VITALS
BODY MASS INDEX: 26.52 KG/M2 | WEIGHT: 145 LBS | OXYGEN SATURATION: 97 % | TEMPERATURE: 98.7 F | HEART RATE: 101 BPM | SYSTOLIC BLOOD PRESSURE: 136 MMHG | RESPIRATION RATE: 16 BRPM | DIASTOLIC BLOOD PRESSURE: 88 MMHG

## 2023-02-17 DIAGNOSIS — R51.9 NONINTRACTABLE HEADACHE, UNSPECIFIED CHRONICITY PATTERN, UNSPECIFIED HEADACHE TYPE: Primary | ICD-10-CM

## 2023-02-17 PROBLEM — Z12.31 SCREENING MAMMOGRAM, ENCOUNTER FOR: Status: RESOLVED | Noted: 2020-12-29 | Resolved: 2023-02-17

## 2023-02-17 PROCEDURE — 96372 THER/PROPH/DIAG INJ SC/IM: CPT

## 2023-02-17 PROCEDURE — 6360000002 HC RX W HCPCS: Performed by: STUDENT IN AN ORGANIZED HEALTH CARE EDUCATION/TRAINING PROGRAM

## 2023-02-17 PROCEDURE — 99284 EMERGENCY DEPT VISIT MOD MDM: CPT

## 2023-02-17 PROCEDURE — 2580000003 HC RX 258: Performed by: STUDENT IN AN ORGANIZED HEALTH CARE EDUCATION/TRAINING PROGRAM

## 2023-02-17 RX ORDER — METOCLOPRAMIDE HYDROCHLORIDE 5 MG/ML
10 INJECTION INTRAMUSCULAR; INTRAVENOUS ONCE
Status: COMPLETED | OUTPATIENT
Start: 2023-02-17 | End: 2023-02-17

## 2023-02-17 RX ORDER — KETOROLAC TROMETHAMINE 30 MG/ML
30 INJECTION, SOLUTION INTRAMUSCULAR; INTRAVENOUS ONCE
Status: COMPLETED | OUTPATIENT
Start: 2023-02-17 | End: 2023-02-17

## 2023-02-17 RX ORDER — DIPHENHYDRAMINE HYDROCHLORIDE 50 MG/ML
25 INJECTION INTRAMUSCULAR; INTRAVENOUS ONCE
Status: COMPLETED | OUTPATIENT
Start: 2023-02-17 | End: 2023-02-17

## 2023-02-17 RX ORDER — 0.9 % SODIUM CHLORIDE 0.9 %
1000 INTRAVENOUS SOLUTION INTRAVENOUS ONCE
Status: COMPLETED | OUTPATIENT
Start: 2023-02-18 | End: 2023-02-18

## 2023-02-17 RX ADMIN — KETOROLAC TROMETHAMINE 30 MG: 30 INJECTION, SOLUTION INTRAMUSCULAR; INTRAVENOUS at 23:58

## 2023-02-17 RX ADMIN — SODIUM CHLORIDE 1000 ML: 9 INJECTION, SOLUTION INTRAVENOUS at 23:57

## 2023-02-17 RX ADMIN — METOCLOPRAMIDE 10 MG: 5 INJECTION, SOLUTION INTRAMUSCULAR; INTRAVENOUS at 23:59

## 2023-02-17 RX ADMIN — DIPHENHYDRAMINE HYDROCHLORIDE 25 MG: 50 INJECTION, SOLUTION INTRAMUSCULAR; INTRAVENOUS at 23:59

## 2023-02-18 RX ORDER — BUTALBITAL, ACETAMINOPHEN AND CAFFEINE 50; 325; 40 MG/1; MG/1; MG/1
1 TABLET ORAL EVERY 4 HOURS PRN
Qty: 30 TABLET | Refills: 0 | Status: SHIPPED | OUTPATIENT
Start: 2023-02-18

## 2023-02-20 NOTE — ED PROVIDER NOTES
[facility]      Pt Name: Tyson Mccoy  MRN: [id number]  [place] 1980  Date of evaluation: 2/20/23    CHIEF COMPLAINT       Chief Complaint   Patient presents with    Headache     X3 days, has been taking Tylenol and Advil at home       [address] Mariel Salguero is a 36 y.o. female who presents with headache. Has been attempting Tylenol and Advil with minimal relief. Does endorse previous headache history. Pain has been ongoing for the past 3 days. Gradually worsening. PASTMEDICAL HISTORY     Past Medical History:   Diagnosis Date    Mild intermittent asthma without complication     Mild intermittent asthma without complication 8/41/2615     Past Problem List  Patient Active Problem List   Diagnosis Code    Menopausal symptom N95.1    Status post hysterectomy M05.928    Umbilical hernia without obstruction and without gangrene K42.9    Urinary tract infection without hematuria N39.0    Pelvic pain in female R10.2    Hydrosalpinx N70.11    Acute vaginitis N76.0    Pain of both breasts N64.4    Swollen vein R09.89    Genital herpes simplex A60.00    UTI symptoms R39.9    Vaginal itching N89.8    Vaginal burning N94.9    Mild intermittent asthma without complication R19.57    Chronic nonintractable headache R51.9, G89.29    Right sided facial pain R51.9       SURGICAL HISTORY       Past Surgical History:   Procedure Laterality Date    HYSTERECTOMY (CERVIX STATUS UNKNOWN)         CURRENT MEDICATIONS       Discharge Medication List as of 2/18/2023  1:26 AM        CONTINUE these medications which have NOT CHANGED    Details   albuterol sulfate HFA (VENTOLIN HFA) 108 (90 Base) MCG/ACT inhaler Inhale 2 puffs into the lungs 4 times daily as needed for Wheezing or Shortness of Breath, Disp-54 g, R-1Normal             ALLERGIES     has No Known Allergies. FAMILY HISTORY     has no family status information on file.         SOCIAL HISTORY       Social History     Tobacco Use    Smoking status: Some Days     Packs/day: 0.50     Types: Cigarettes    Smokeless tobacco: Never   Substance Use Topics    Alcohol use: No     Alcohol/week: 0.0 standard drinks     Comment: social    Drug use: No       PHYSICAL EXAM     INITIAL VITALS: /88   Pulse (!) 101   Temp 98.7 °F (37.1 °C)   Resp 16   Wt 145 lb (65.8 kg)   LMP  (LMP Unknown)   SpO2 97%   BMI 26.52 kg/m²    Physical Exam  Vitals and nursing note reviewed. Constitutional:       General: She is not in acute distress. Appearance: She is well-developed. She is not toxic-appearing. HENT:      Head: Normocephalic and atraumatic. Nose: Nose normal.      Mouth/Throat:      Mouth: Mucous membranes are moist.   Eyes:      General: No scleral icterus. Conjunctiva/sclera: Conjunctivae normal.      Pupils: Pupils are equal, round, and reactive to light. Cardiovascular:      Rate and Rhythm: Normal rate and regular rhythm. Pulmonary:      Effort: Pulmonary effort is normal. No respiratory distress. Musculoskeletal:         General: Normal range of motion. Skin:     General: Skin is warm and dry. Findings: No erythema or rash. Neurological:      Mental Status: She is alert and oriented to person, place, and time. Comments: No facial asymmetry, no aphasia, no dysarthria, EOMI, PERRLA; 5/5 strength in upper and lower extremities b/l     Psychiatric:         Behavior: Behavior normal.       Neuro: No facial asymmetry, no aphasia, no dysarthria, EOMI, PERRLA; 5/5 strength in upper and lower extremities b/l; ambulatory with slow steady gait    MEDICAL DECISION MAKING / ED COURSE:   Summary of Patient Presentation:      1)  Number and Complexity of Problems  Problem List This Visit:    1.  Nonintractable headache, unspecified chronicity pattern, unspecified headache type        Differential Diagnosis: Migraine versus tension headache versus intracranial hemorrhage    Diagnoses Considered but Do Not Suspect: Intracranial hemorrhage    Pertinent Comorbid Conditions:    Past Medical History:   Diagnosis Date    Mild intermittent asthma without complication     Mild intermittent asthma without complication 9/45/0049       2)  Data Reviewed    External Documents Reviewed: Previous ER visits reviewed by myself    3)  Treatment and Disposition  [Patient repeat assessment, Disposition discussion with patient/family, Case discussed with consulting clinician, MIPS, Social determinants of health impacting treatment or disposition, Shared Decision Making, Code Status Discussion:]    Nonfocal neuro exam.  Otherwise nontoxic and well-appearing. Did have significant pain relief with medications. Patient states that she thinks she is dehydrated given her nausea and decreased intake. Given fluid bolus. Based on the low acuity of concerning symptoms and improvement of symptoms, patient will be discharged with follow up and prescription information listed in the Disposition section. Patient states they will follow-up with primary care physician and/or return to the emergency department should they experience a change or worsening of symptoms. Patient will be discharged with resources: summary of visit, instructions, follow-up information, prescriptions if necessary. Patient/ family instructed to read discharge paperwork. All of their questions and concerns were addressed. Suspicion for any acute life-threatening processes is low. Patient voices understanding of plan.  0      DATA FOR LAB AND RADIOLOGY TESTS ORDERED BELOW ARE REVIEWED BY THE ED CLINICIAN:    RADIOLOGY: All x-rays, CT, MRI, and formal ultrasound images (except ED bedside ultrasound) are read by the radiologist, see reports below, unless otherwise noted in MDM or here. Reports below are reviewed by myself. No orders to display       LABS: Lab orders shown below, the results are reviewed by myself, and all abnormals are listed below.   Labs Reviewed - No data to display    Vitals Reviewed:    Vitals:    02/17/23 2210 02/17/23 2211 02/17/23 2212   BP:  136/88    Pulse: (!) 103 (!) 101    Resp: 16     Temp:   98.7 °F (37.1 °C)   SpO2: 97%     Weight:   145 lb (65.8 kg)     MEDICATIONS GIVEN TO PATIENT THIS ENCOUNTER:  Orders Placed This Encounter   Medications    ketorolac (TORADOL) injection 30 mg    diphenhydrAMINE (BENADRYL) injection 25 mg    metoclopramide (REGLAN) injection 10 mg    0.9 % sodium chloride bolus    butalbital-acetaminophen-caffeine (FIORICET, ESGIC) -40 MG per tablet     Sig: Take 1 tablet by mouth every 4 hours as needed for Headaches     Dispense:  30 tablet     Refill:  0     DISCHARGE PRESCRIPTIONS:  Discharge Medication List as of 2/18/2023  1:26 AM        PHYSICIAN CONSULTS ORDERED THIS ENCOUNTER:  None    ED Course Notes From Epic Narrator:         CRITICAL CARE:   0    PROCEDURES:  none    FINAL IMPRESSION      1.  Nonintractable headache, unspecified chronicity pattern, unspecified headache type          DISPOSITION/PLAN   DISPOSITION Decision To Discharge 02/18/2023 01:25:27 AM      OUTPATIENT FOLLOW UP THE PATIENT:  Siria ManuelSHANNON - NP  [address] [address]  [phone]    Schedule an appointment as soon as possible for a visit in 1 week  As needed      DISCHARGE MEDICATIONS:  Discharge Medication List as of 2/18/2023  1:26 AM          Conrado Harrell DO  EmergencyMedicine Attending    (Please note that portions of this note were completed with a voice recognition program.  Efforts were made to edit the dictations but occasionally words are mis-transcribed.)     Conrado Harrell DO  02/20/23 1739

## 2023-03-09 ENCOUNTER — TELEPHONE (OUTPATIENT)
Dept: OBGYN CLINIC | Age: 43
End: 2023-03-09

## 2023-03-09 RX ORDER — METRONIDAZOLE 500 MG/1
500 TABLET ORAL 2 TIMES DAILY
Qty: 14 TABLET | Refills: 0 | Status: SHIPPED | OUTPATIENT
Start: 2023-03-09 | End: 2023-03-16

## 2023-03-09 RX ORDER — FLUCONAZOLE 100 MG/1
100 TABLET ORAL DAILY
Qty: 7 TABLET | Refills: 0 | Status: SHIPPED | OUTPATIENT
Start: 2023-03-09 | End: 2023-03-16

## 2023-03-09 NOTE — TELEPHONE ENCOUNTER
Patient is having vaginal discharge and irritation. She is asking if something can be sent to the pharmacy. Please advise.

## 2023-05-24 ENCOUNTER — OFFICE VISIT (OUTPATIENT)
Dept: FAMILY MEDICINE CLINIC | Age: 43
End: 2023-05-24

## 2023-05-24 VITALS
WEIGHT: 142.3 LBS | BODY MASS INDEX: 26.19 KG/M2 | HEART RATE: 77 BPM | HEIGHT: 62 IN | RESPIRATION RATE: 20 BRPM | DIASTOLIC BLOOD PRESSURE: 82 MMHG | SYSTOLIC BLOOD PRESSURE: 124 MMHG | OXYGEN SATURATION: 98 %

## 2023-05-24 DIAGNOSIS — J45.20 MILD INTERMITTENT ASTHMA WITHOUT COMPLICATION: ICD-10-CM

## 2023-05-24 DIAGNOSIS — J12.82 PNEUMONIA DUE TO COVID-19 VIRUS: Primary | ICD-10-CM

## 2023-05-24 DIAGNOSIS — U07.1 PNEUMONIA DUE TO COVID-19 VIRUS: Primary | ICD-10-CM

## 2023-05-24 PROCEDURE — 99214 OFFICE O/P EST MOD 30 MIN: CPT | Performed by: NURSE PRACTITIONER

## 2023-05-24 RX ORDER — ALBUTEROL SULFATE 90 UG/1
2 AEROSOL, METERED RESPIRATORY (INHALATION) 4 TIMES DAILY PRN
Qty: 54 G | Refills: 1 | Status: SHIPPED | OUTPATIENT
Start: 2023-05-24

## 2023-05-24 SDOH — ECONOMIC STABILITY: FOOD INSECURITY: WITHIN THE PAST 12 MONTHS, THE FOOD YOU BOUGHT JUST DIDN'T LAST AND YOU DIDN'T HAVE MONEY TO GET MORE.: NEVER TRUE

## 2023-05-24 SDOH — ECONOMIC STABILITY: INCOME INSECURITY: HOW HARD IS IT FOR YOU TO PAY FOR THE VERY BASICS LIKE FOOD, HOUSING, MEDICAL CARE, AND HEATING?: NOT HARD AT ALL

## 2023-05-24 SDOH — ECONOMIC STABILITY: FOOD INSECURITY: WITHIN THE PAST 12 MONTHS, YOU WORRIED THAT YOUR FOOD WOULD RUN OUT BEFORE YOU GOT MONEY TO BUY MORE.: NEVER TRUE

## 2023-05-24 SDOH — ECONOMIC STABILITY: HOUSING INSECURITY
IN THE LAST 12 MONTHS, WAS THERE A TIME WHEN YOU DID NOT HAVE A STEADY PLACE TO SLEEP OR SLEPT IN A SHELTER (INCLUDING NOW)?: NO

## 2023-05-24 NOTE — PROGRESS NOTES
Freeman Mathis (:  1980) is a 36 y.o. female,Established patient, here for evaluation of the following chief complaint(s):  ED Follow-up (Pneumonia and covid 2023)         ASSESSMENT/PLAN:  1. Pneumonia due to COVID-19 virus-       XR CHEST (2 VW); Future  Comments:  Improving. Finish course of antibiotics and steroids. Should you develop worsening symptoms such as fever, shortness of breath, severe cough, dyspnea, or chest pain please seek emergent care. 2. Mild intermittent asthma without complication  -     Full PFT Study With Bronchodilator; Future    -     [facility] - Chris Castro MD, Pulmonology, [place]  -     albuterol sulfate HFA (VENTOLIN HFA) 108 (90 Base) MCG/ACT inhaler; Inhale 2 puffs into the lungs 4 times daily as needed for Wheezing or Shortness of Breath, Disp-54 g, R-1Normal      Return in about 6 months (around 2023) for physical.         Subjective   SUBJECTIVE/OBJECTIVE:  Presents for ER follow up  Last visit with this office 09/15/2021    Went to [facility] [place] 2023 for shortness of breath, cough, fatigue, chest pain   Diagnosed with pneumonia, positive for covid-19 2023  Discharged with 7 days of doxy - still has a few days left of this  Feeling better overall  Continued fatigue, some shortness of breath - no chest pain   Has not had fever since Saturday   Called her previous PCP, was given Rx for prednisone 20mg BID x5 days     Requesting referral to pulm for asthma management  Reports using albuterol up to twice a day some days  Has never completed PFT before    Denies any other problems/concerns at this time           Review of Systems   Constitutional:  Positive for fatigue. Negative for activity change, chills and unexpected weight change. HENT:  Negative for hearing loss, postnasal drip, sinus pressure and sinus pain. Eyes:  Negative for pain and visual disturbance. Respiratory:  Positive for shortness of breath. Negative for chest tightness and wheezing.

## 2023-05-25 ASSESSMENT — ENCOUNTER SYMPTOMS
CHEST TIGHTNESS: 0
SINUS PAIN: 0
EYE PAIN: 0
CONSTIPATION: 0
COLOR CHANGE: 0
BACK PAIN: 0
ABDOMINAL PAIN: 0
WHEEZING: 0
SHORTNESS OF BREATH: 1
VOMITING: 0
NAUSEA: 0
SINUS PRESSURE: 0
DIARRHEA: 0

## 2023-06-06 ENCOUNTER — TELEPHONE (OUTPATIENT)
Dept: OBGYN CLINIC | Age: 43
End: 2023-06-06

## 2023-06-06 ENCOUNTER — HOSPITAL ENCOUNTER (EMERGENCY)
Age: 43
Discharge: HOME | End: 2023-06-07
Attending: EMERGENCY MEDICINE

## 2023-06-06 ENCOUNTER — APPOINTMENT (OUTPATIENT)
Dept: ULTRASOUND IMAGING | Age: 43
End: 2023-06-06

## 2023-06-06 ENCOUNTER — APPOINTMENT (OUTPATIENT)
Dept: CT IMAGING | Age: 43
End: 2023-06-06

## 2023-06-06 VITALS
HEIGHT: 62 IN | TEMPERATURE: 97.7 F | SYSTOLIC BLOOD PRESSURE: 136 MMHG | WEIGHT: 141 LBS | RESPIRATION RATE: 18 BRPM | BODY MASS INDEX: 25.95 KG/M2 | HEART RATE: 87 BPM | DIASTOLIC BLOOD PRESSURE: 94 MMHG | OXYGEN SATURATION: 100 %

## 2023-06-06 DIAGNOSIS — R10.2 SUPRAPUBIC ABDOMINAL PAIN: Primary | ICD-10-CM

## 2023-06-06 DIAGNOSIS — N83.209 SIMPLE OVARIAN CYST: ICD-10-CM

## 2023-06-06 LAB
ALBUMIN SERPL-MCNC: 3.9 G/DL (ref 3.5–5.2)
ALBUMIN/GLOB SERPL: 1.3 {RATIO} (ref 1–2.5)
ALP SERPL-CCNC: 50 U/L (ref 35–104)
ALT SERPL-CCNC: 16 U/L (ref 5–33)
ANION GAP SERPL CALCULATED.3IONS-SCNC: 9 MMOL/L (ref 9–17)
AST SERPL-CCNC: 19 U/L
BACTERIA URNS QL MICRO: ABNORMAL
BASOPHILS # BLD: 0.06 K/UL (ref 0–0.2)
BASOPHILS NFR BLD: 1 % (ref 0–2)
BILIRUB SERPL-MCNC: 0.3 MG/DL (ref 0.3–1.2)
BILIRUB UR QL STRIP: NEGATIVE
BUN SERPL-MCNC: 12 MG/DL (ref 6–20)
CALCIUM SERPL-MCNC: 8.9 MG/DL (ref 8.6–10.4)
CANDIDA SPECIES, DNA PROBE: NEGATIVE
CASTS #/AREA URNS LPF: ABNORMAL /LPF (ref 0–8)
CHLORIDE SERPL-SCNC: 105 MMOL/L (ref 98–107)
CLARITY UR: ABNORMAL
CO2 SERPL-SCNC: 23 MMOL/L (ref 20–31)
COLOR UR: YELLOW
CREAT SERPL-MCNC: 0.5 MG/DL (ref 0.5–0.9)
EOSINOPHIL # BLD: 0.33 K/UL (ref 0–0.44)
EOSINOPHILS RELATIVE PERCENT: 4 % (ref 1–4)
EPI CELLS #/AREA URNS HPF: ABNORMAL /HPF (ref 0–5)
ERYTHROCYTE [DISTWIDTH] IN BLOOD BY AUTOMATED COUNT: 13 % (ref 11.8–14.4)
GARDNERELLA VAGINALIS, DNA PROBE: POSITIVE
GFR SERPL CREATININE-BSD FRML MDRD: >60 ML/MIN/1.73M2
GLUCOSE SERPL-MCNC: 97 MG/DL (ref 70–99)
GLUCOSE UR STRIP-MCNC: NEGATIVE MG/DL
HCT VFR BLD AUTO: 38.1 % (ref 36.3–47.1)
HGB BLD-MCNC: 12 G/DL (ref 11.9–15.1)
HGB UR QL STRIP.AUTO: NEGATIVE
IMM GRANULOCYTES # BLD AUTO: 0.03 K/UL (ref 0–0.3)
IMM GRANULOCYTES NFR BLD: 0 %
KETONES UR STRIP-MCNC: NEGATIVE MG/DL
LEUKOCYTE ESTERASE UR QL STRIP: NEGATIVE
LIPASE SERPL-CCNC: 39 U/L (ref 13–60)
LYMPHOCYTES # BLD: 30 % (ref 24–43)
LYMPHOCYTES NFR BLD: 2.67 K/UL (ref 1.1–3.7)
MCH RBC QN AUTO: 27.1 PG (ref 25.2–33.5)
MCHC RBC AUTO-ENTMCNC: 31.5 G/DL (ref 28.4–34.8)
MCV RBC AUTO: 86 FL (ref 82.6–102.9)
MONOCYTES NFR BLD: 0.71 K/UL (ref 0.1–1.2)
MONOCYTES NFR BLD: 8 % (ref 3–12)
NEUTROPHILS NFR BLD: 57 % (ref 36–65)
NEUTS SEG NFR BLD: 5.24 K/UL (ref 1.5–8.1)
NITRITE UR QL STRIP: NEGATIVE
NRBC AUTOMATED: 0 PER 100 WBC
PH UR STRIP: 6 [PH] (ref 5–8)
PLATELET # BLD AUTO: 309 K/UL (ref 138–453)
PMV BLD AUTO: 9.3 FL (ref 8.1–13.5)
POTASSIUM SERPL-SCNC: 3.8 MMOL/L (ref 3.7–5.3)
PROT SERPL-MCNC: 6.8 G/DL (ref 6.4–8.3)
PROT UR STRIP-MCNC: NEGATIVE MG/DL
RBC # BLD AUTO: 4.43 M/UL (ref 3.95–5.11)
RBC #/AREA URNS HPF: ABNORMAL /HPF (ref 0–4)
SODIUM SERPL-SCNC: 137 MMOL/L (ref 135–144)
SOURCE: ABNORMAL
SP GR UR STRIP: 1.02 (ref 1–1.03)
TRICHOMONAS VAGINALIS DNA: NEGATIVE
UROBILINOGEN UR STRIP-ACNC: NORMAL
WBC #/AREA URNS HPF: ABNORMAL /HPF (ref 0–5)
WBC OTHER # BLD: 9 K/UL (ref 3.5–11.3)

## 2023-06-06 PROCEDURE — 85027 COMPLETE CBC AUTOMATED: CPT

## 2023-06-06 PROCEDURE — 81001 URINALYSIS AUTO W/SCOPE: CPT

## 2023-06-06 PROCEDURE — 93976 VASCULAR STUDY: CPT

## 2023-06-06 PROCEDURE — 76830 TRANSVAGINAL US NON-OB: CPT

## 2023-06-06 PROCEDURE — 2580000003 HC RX 258: Performed by: STUDENT IN AN ORGANIZED HEALTH CARE EDUCATION/TRAINING PROGRAM

## 2023-06-06 PROCEDURE — 87591 N.GONORRHOEAE DNA AMP PROB: CPT

## 2023-06-06 PROCEDURE — 74177 CT ABD & PELVIS W/CONTRAST: CPT

## 2023-06-06 PROCEDURE — 87660 TRICHOMONAS VAGIN DIR PROBE: CPT

## 2023-06-06 PROCEDURE — 6360000002 HC RX W HCPCS: Performed by: STUDENT IN AN ORGANIZED HEALTH CARE EDUCATION/TRAINING PROGRAM

## 2023-06-06 PROCEDURE — 80053 COMPREHEN METABOLIC PANEL: CPT

## 2023-06-06 PROCEDURE — 87480 CANDIDA DNA DIR PROBE: CPT

## 2023-06-06 PROCEDURE — 6360000004 HC RX CONTRAST MEDICATION: Performed by: STUDENT IN AN ORGANIZED HEALTH CARE EDUCATION/TRAINING PROGRAM

## 2023-06-06 PROCEDURE — 87510 GARDNER VAG DNA DIR PROBE: CPT

## 2023-06-06 PROCEDURE — 83690 ASSAY OF LIPASE: CPT

## 2023-06-06 PROCEDURE — 87491 CHLMYD TRACH DNA AMP PROBE: CPT

## 2023-06-06 RX ORDER — KETOROLAC TROMETHAMINE 15 MG/ML
15 INJECTION, SOLUTION INTRAMUSCULAR; INTRAVENOUS ONCE
Status: COMPLETED | OUTPATIENT
Start: 2023-06-06 | End: 2023-06-06

## 2023-06-06 RX ORDER — 0.9 % SODIUM CHLORIDE 0.9 %
1000 INTRAVENOUS SOLUTION INTRAVENOUS ONCE
Status: COMPLETED | OUTPATIENT
Start: 2023-06-06 | End: 2023-06-06

## 2023-06-06 RX ORDER — ONDANSETRON 2 MG/ML
4 INJECTION INTRAMUSCULAR; INTRAVENOUS ONCE
Status: COMPLETED | OUTPATIENT
Start: 2023-06-06 | End: 2023-06-06

## 2023-06-06 RX ADMIN — ONDANSETRON 4 MG: 2 INJECTION INTRAMUSCULAR; INTRAVENOUS at 15:25

## 2023-06-06 RX ADMIN — SODIUM CHLORIDE 1000 ML: 9 INJECTION, SOLUTION INTRAVENOUS at 15:34

## 2023-06-06 RX ADMIN — KETOROLAC TROMETHAMINE 15 MG: 15 INJECTION, SOLUTION INTRAMUSCULAR; INTRAVENOUS at 15:24

## 2023-06-06 RX ADMIN — IOPAMIDOL 75 ML: 755 INJECTION, SOLUTION INTRAVENOUS at 18:40

## 2023-06-06 ASSESSMENT — ENCOUNTER SYMPTOMS
NAUSEA: 1
BACK PAIN: 0
VOMITING: 0
ABDOMINAL PAIN: 1
SHORTNESS OF BREATH: 0
SORE THROAT: 0
COUGH: 0

## 2023-06-06 ASSESSMENT — PAIN DESCRIPTION - LOCATION: LOCATION: ABDOMEN

## 2023-06-06 ASSESSMENT — PAIN SCALES - GENERAL: PAINLEVEL_OUTOF10: 8

## 2023-06-06 ASSESSMENT — PAIN DESCRIPTION - ORIENTATION: ORIENTATION: LOWER

## 2023-06-06 NOTE — ED NOTES
The following labs were labeled with appropriate pt sticker and tubed to lab:     [] Blue     [] Lavender   [] on ice  [] Green/yellow  [] Green/black [] on ice  [] [place] Viola  [] on ice  [] Yellow  [] Red  [] Type/ Screen  [] ABG  [] VBG    [] COVID-19 swab    [] Rapid  [] PCR  [] Flu swab  [] Peds Viral Panel     [x] Urine Sample  [] Fecal Sample  [] Pelvic Cultures  [] Blood Cultures  [] X 2  [] STREP Cultures       Lindsay Calixto RN  06/06/23 0507

## 2023-06-06 NOTE — ED NOTES
Pt ambulatory to restroom with steady/even gait, provided with specimen cup for  urine sample      Sumit Dickey RN  06/06/23 8978

## 2023-06-06 NOTE — ED PROVIDER NOTES
FACULTY SIGN-OUT  ADDENDUM     Care of this patient was assumed from previous attending physician. The patient's initial evaluation and plan have been discussed with the prior provider who initially evaluated the patient. Note Started: 4:12 PM EDT    Attestation  I was available and discussed any additional care issues that arose and coordinated the management plans with the resident(s) caring for the patient during my duty period. Any areas of disagreement with resident's documentation of care or procedures are noted on the chart. I was personally present for the key portions of any/all procedures, during my duty period. I have documented in the chart those procedures where I was not present during the key portions. ED COURSE      The patient was given the following medications:  Orders Placed This Encounter   Medications    ondansetron (ZOFRAN) injection 4 mg    ketorolac (TORADOL) injection 15 mg    0.9 % sodium chloride bolus       RECENT VITALS:   Temp: 97.7 °F (36.5 °C), Pulse: 87, Respirations: 18, BP: (!) 136/94    MEDICAL DECISION MAKING        Kang Bernal is a 36 y.o. female who presents to the Emergency Department with complaints of abd and CVA tenderness. UA pending.  If UA unremarkable, consider Elvi Lucas MD  Attending Emergency Physician    (Please note that portions of this note were completed with a voice recognition program.  Efforts were made to edit the dictations but occasionally words are mis-transcribed.)          Diane Thompson MD  06/06/23 4797 Rings rings then goes to a fast busy signal

## 2023-06-06 NOTE — ED PROVIDER NOTES
Note Started: 4:15 PM EDT         [facility]     Emergency Department     Faculty Attestation    I performed a history and physical examination of the patient and discussed management with the resident. I reviewed the residents note and agree with the documented findings and plan of care. Any areas of disagreement are noted on the chart. I was personally present for the key portions of any procedures. I have documented in the chart those procedures where I was not present during the key portions. I have reviewed the emergency nurses triage note. I agree with the chief complaint, past medical history, past surgical history, allergies, medications, social and family history as documented unless otherwise noted below. For Physician Assistant/ Nurse Practitioner cases/documentation I have personally evaluated this patient and have completed at least one if not all key elements of the E/M (history, physical exam, and MDM). Additional findings are as noted. I have personally seen and evaluated the patient. I find the patient's history and physical exam are consistent with the NP/PA documentation. I agree with the care provided, treatment rendered, disposition and follow-up plan. 59-year-old female presenting with nausea and suprapubic abdominal pain. Had COVID approximately 3 weeks ago, followed by pneumonia, and now has abdominal pain. Has had a hysterectomy, but still has appendix and gallbladder. Pain does not change with urination. No fever. Exam:  General : Laying on the bed, awake, alert, and in no acute distress  CV : normal rate and regular rhythm  Lungs : Breathing comfortably on room air with no tachypnea, hypoxia, or increased work of breathing  Abdomen : soft, tender in the suprapubic and CVA area. No RLQ or LLQ pain to palpation. No flank pain.      DDx:UTI/Pyelo, dehydration, atypical appendicitis, viral infection    Plan:  Lab workup including CBC, electrolytes,

## 2023-06-06 NOTE — ED NOTES
Report given to [facility], all questions addressed      Bernardo Benson RN  06/06/23 6920 Primary Defect Width In Cm (Final Defect Size - Required For Flaps/Grafts): 1.6

## 2023-06-06 NOTE — ED NOTES
Pt to ED via self with c/o abdominal pain and nausea   Denies chest pain  Hx hysterectomy   Pt is a/ox4, ambulatory, RR even and non labored, call light in reach      Stefani Myles RN  06/06/23 2532

## 2023-06-06 NOTE — TELEPHONE ENCOUNTER
Patient called complaining of pelvic pain. She was scheduled to see the provider for 06-07-23. The pain is worsening and the pain pills she has is not helping at all. She said she can't take the pain. She was advised to  [facility] or the nearest [facility] room. She voiced understanding.

## 2023-06-06 NOTE — DISCHARGE INSTRUCTIONS
Please follow-up with your OB/GYN tomorrow. If given narcotics (opiates) during this Emergency Department visit, please do not drink, drive or operate any machinery for at least 4 - 6 hours. Avoid eating any spicy food, milk type products or drinks that have caffeine in it. Take all medications as prescribed. For pain use ibuprofen (Motrin) or acetaminophen (Tylenol), unless prescribed medications that have acetaminophen in it. You can take over the counter acetaminophen tablets (1 - 2 tablets of the 500-mg strength every 6 hours) or ibuprofen tablets (2 tablets every 4 hours). PLEASE RETURN TO THE EMERGENCY DEPARTMENT IMMEDIATELY for worsening symptoms, or if you develop any concerning symptoms such as: high fever not relieved by acetaminophen (Tylenol) and/or ibuprofen (Motrin), chills, shortness of breath, chest pain, persistent nausea and/or vomiting, numbness, weakness or tingling in the arms or legs or change in color of the extremities, changes in mental status, persistent headache, blurry vision. Return within 8 - 12 hours if you have any of the following: worsening of pain in your abdomen, no food sounds good to you, you continue to vomit, pain goes to your back, have pain in the abdomen when going over a bump in the car or when you jump up and down, develop vaginal bleeding or discharge, inability to urinate, unable to follow up with your physician, or other any other care or concern.

## 2023-06-06 NOTE — ED PROVIDER NOTES
[facility]  ED  Emergency Department Encounter  Emergency Medicine Resident     Pt Name:Kang Rascon  MRN: [id number]  [place] 1980  Date of evaluation: 6/6/23  PCP:  SHANNON Gonzáles NP  Note Started: 3:10 PM EDT      CHIEF COMPLAINT       Chief Complaint   Patient presents with    Abdominal Pain     HISTORY OF PRESENT ILLNESS  (Location/Symptom, Timing/Onset, Context/Setting, Quality, Duration, Modifying Factors, Severity.)      Kang Rascon is a 36 y.o. female who presents with 4-day history of lower abdominal pain. Patient reporting some dysuria with her pain as well. Denies vaginal symptoms. History of a hysterectomy. Patient reporting some nausea, no vomiting, diarrhea or constipation. No chest pain, shortness of breath, fevers or chills. No other previous intra-abdominal surgery. PAST MEDICAL / SURGICAL / SOCIAL / FAMILY HISTORY      has a past medical history of Mild intermittent asthma without complication and Mild intermittent asthma without complication. has a past surgical history that includes Hysterectomy.     Social History     Socioeconomic History    Marital status:      Spouse name: Not on file    Number of children: Not on file    Years of education: Not on file    Highest education level: Not on file   Occupational History    Not on file   Tobacco Use    Smoking status: Some Days     Packs/day: 0.50     Types: Cigarettes    Smokeless tobacco: Never   Substance and Sexual Activity    Alcohol use: No     Alcohol/week: 0.0 standard drinks     Comment: social    Drug use: No    Sexual activity: Never     Partners: Male   Other Topics Concern    Not on file   Social History Narrative    Not on file     Social Determinants of Health     Financial Resource Strain: Low Risk     Difficulty of Paying Living Expenses: Not hard at all   Food Insecurity: No Food Insecurity    Worried About [address] [facility] in the Last Year: Never true    Ran Out of

## 2023-06-07 ENCOUNTER — OFFICE VISIT (OUTPATIENT)
Dept: OBGYN CLINIC | Age: 43
End: 2023-06-07

## 2023-06-07 VITALS
SYSTOLIC BLOOD PRESSURE: 102 MMHG | HEART RATE: 81 BPM | BODY MASS INDEX: 27.07 KG/M2 | DIASTOLIC BLOOD PRESSURE: 78 MMHG | WEIGHT: 148 LBS

## 2023-06-07 DIAGNOSIS — R10.2 PELVIC PAIN IN FEMALE: Primary | ICD-10-CM

## 2023-06-07 DIAGNOSIS — N64.4 BREAST TENDERNESS: ICD-10-CM

## 2023-06-07 DIAGNOSIS — Z90.710 STATUS POST HYSTERECTOMY: ICD-10-CM

## 2023-06-07 DIAGNOSIS — N76.0 ACUTE VAGINITIS: ICD-10-CM

## 2023-06-07 LAB
C TRACH DNA SPEC QL PROBE+SIG AMP: NEGATIVE
N GONORRHOEA DNA SPEC QL PROBE+SIG AMP: NEGATIVE
SPECIMEN DESCRIPTION: NORMAL

## 2023-06-07 PROCEDURE — 99213 OFFICE O/P EST LOW 20 MIN: CPT | Performed by: SPECIALIST

## 2023-06-07 RX ORDER — IBUPROFEN 800 MG/1
800 TABLET ORAL EVERY 8 HOURS PRN
Qty: 21 TABLET | Refills: 0 | Status: SHIPPED | OUTPATIENT
Start: 2023-06-07

## 2023-06-07 RX ORDER — METRONIDAZOLE 500 MG/1
500 TABLET ORAL 2 TIMES DAILY
Qty: 14 TABLET | Refills: 0 | Status: SHIPPED | OUTPATIENT
Start: 2023-06-07 | End: 2023-06-14

## 2023-06-07 RX ORDER — NORGESTIMATE AND ETHINYL ESTRADIOL 7DAYSX3 28
1 KIT ORAL DAILY
Qty: 28 TABLET | Refills: 2 | Status: SHIPPED | OUTPATIENT
Start: 2023-06-07 | End: 2023-07-05

## 2023-06-07 ASSESSMENT — ENCOUNTER SYMPTOMS
NAUSEA: 0
APNEA: 0
COUGH: 0
ABDOMINAL DISTENTION: 0
VOMITING: 0
DIARRHEA: 0
ABDOMINAL PAIN: 0
EYE PAIN: 0
CONSTIPATION: 0

## 2023-06-07 NOTE — CONSULTS
Obstetric/Gynecology Resident Interval Note    Writer contacted by emergency medicine resident who requested telephone consult only. Patient presented to the emergency department with suprapubic pain with associated nausea. Patient denies vaginal bleeding. On arrival, ED resident reports that the patient was in no acute distress, nontoxic-appearing, hemodynamically stable, vital signs stable. Pelvic exam unremarkable, without abnormality or bleeding per ED resident. CT of abdomen and pelvis reveals fluid-filled tubular structure extending towards the right adnexa, suggesting hydro salpinx with bilateral, simple ovarian cysts. Patient is status post hysterectomy and bilateral salpingectomy. Follow-up transvaginal ultrasound revealed midline simple cyst measuring 4 x 2 x 1 cm, unclear if associated with right ovary or vaginal cuff and recommended follow-up ultrasound. Labs completed, overall unremarkable without concern. Discussed with emergency medicine resident that imaging does not suggest anything with immediate concern. Discussed that if abdominal exam is not overall concerning, and without peritoneal signs and the patient's vital signs stables have remained stable, it would be reasonable for patient to continue with outpatient management of imaging finding. Advised 1 week follow-up with OB/GYN (Dr. Fabricio Mata) for continued assessment of suprapubic pain and consider repeat imaging. Emergency medicine resident voices little concern for patient and agrees with discharge at this time. Senior resident present for telephone consultation and in agreement with plan.     Martha Coronel MD  OB/GYN Resident, PGY1  [facility]  6/7/2023, 12:30 AM

## 2023-06-07 NOTE — ED PROVIDER NOTES
[facility]   Emergency Department  Emergency Medicine Attending Sign-out   Note started: 9:28 AM EDT    Care of Jared Vides was assumed from previous attending Dr. Bi Aguero at 12 AM and is being seen for Abdominal Pain  . The patient's initial evaluation and plan have been discussed with the prior provider who initially evaluated the patient. Attestation  I was available and discussed any additional care issues that arose and coordinated the management plans with the resident(s) caring for the patient during my duty period. Any areas of disagreement with resident's documentation of care or procedures are noted on the chart. I was personally present for the key portions of any/all procedures, during my duty period. I have documented in the chart those procedures where I was not present during the key portions. BRIEF PATIENT SUMMARY/MDM COURSE PER INITIAL PROVIDER:   RECENT VITALS:     Temp: 97.7 °F (36.5 °C),  Pulse: 87, Respirations: 18, BP: (!) 136/94, SpO2: 100 %    This patient is a 36 y.o. Female with abdominal pain. CT scan showed a tubular structure. Awaiting pelvic ultrasound.      DIAGNOSTICS/MEDICATIONS:     MEDICATIONS GIVEN:  ED Medication Orders (From admission, onward)      Start Ordered     Status Ordering Provider    06/06/23 1823 06/06/23 1824  iopamidol (ISOVUE-370) 76 % injection 75 mL  IMG ONCE PRN         Last MAR action: Given - by Melly Puentes on 06/06/23 at [address], [place]    06/06/23 1530 06/06/23 1515  ondansetron (ZOFRAN) injection 4 mg  ONCE         Last MAR action: Given - by Joseluis Henry on 06/06/23 at [address] [facility], [place]    06/06/23 1530 06/06/23 1515  ketorolac (TORADOL) injection 15 mg  ONCE         Last MAR action: Given - by Joseluis Henry on 06/06/23 at [address] [facility], [place]    06/06/23 1530 06/06/23 1515  0.9 % sodium chloride bolus  ONCE         Last MAR action: Stopped - by Joseluis Henry on 06/06/23 at [place] [address], [place]

## 2023-06-07 NOTE — ED PROVIDER NOTES
[facility]  ED  Emergency Department  Emergency Medicine Resident Sign-out     Care of Corrinne Prosper was assumed from Dr. Collette Roberts and is being seen for Abdominal Pain  . The patient's initial evaluation and plan have been discussed with the prior provider who initially evaluated the patient. EMERGENCY DEPARTMENT COURSE / MEDICAL DECISION MAKING:       MEDICATIONS GIVEN:  Orders Placed This Encounter   Medications    ondansetron (ZOFRAN) injection 4 mg    ketorolac (TORADOL) injection 15 mg    0.9 % sodium chloride bolus    iopamidol (ISOVUE-370) 76 % injection 75 mL    ibuprofen (IBU) 800 MG tablet     Sig: Take 1 tablet by mouth every 8 hours as needed for Pain     Dispense:  21 tablet     Refill:  0       LABS / RADIOLOGY:     Labs Reviewed   VAGINITIS DNA PROBE - Abnormal; Notable for the following components:       Result Value    Gardnerella Vaginalis, DNA Probe POSITIVE (*)     All other components within normal limits   URINALYSIS WITH REFLEX TO CULTURE - Abnormal; Notable for the following components:    Turbidity UA Cloudy (*)     All other components within normal limits   MICROSCOPIC URINALYSIS - Abnormal; Notable for the following components:    Bacteria, UA FEW (*)     All other components within normal limits   C.TRACHOMATIS N.GONORRHOEAE DNA   CBC WITH AUTO DIFFERENTIAL   COMPREHENSIVE METABOLIC PANEL   LIPASE       CT ABDOMEN PELVIS W IV CONTRAST Additional Contrast? None    Result Date: 6/6/2023  EXAMINATION: CT OF THE ABDOMEN AND PELVIS WITH CONTRAST 6/6/2023 6:23 pm TECHNIQUE: CT of the abdomen and pelvis was performed with the administration of intravenous contrast. Multiplanar reformatted images are provided for review. Automated exposure control, iterative reconstruction, and/or weight based adjustment of the mA/kV was utilized to reduce the radiation dose to as low as reasonably achievable.  COMPARISON: None HISTORY: ORDERING SYSTEM PROVIDED HISTORY: Suprapubic abdo

## 2023-06-07 NOTE — PROGRESS NOTES
She had a CT scan and an ultrasound done. She also had cultures done. Patient states she is still having the pain. She has had a hysterectomy, but she is having breast tenderness and hot flashes like she is going to have a period. Review of Systems   Constitutional:  Negative for activity change, appetite change and fever. HENT:  Negative for ear discharge and ear pain. Eyes:  Negative for pain and visual disturbance. Respiratory:  Negative for apnea and cough. Cardiovascular:  Negative for chest pain, palpitations and leg swelling. Gastrointestinal:  Negative for abdominal distention, abdominal pain, constipation, diarrhea, nausea and vomiting. Endocrine: Negative. Genitourinary:  Positive for pelvic pain. Negative for difficulty urinating, dysuria and menstrual problem. Hot flashes, breast tenderness   Musculoskeletal:  Negative for neck pain and neck stiffness. Skin: Negative. Neurological:  Negative for light-headedness and numbness. Hematological: Negative. Does not bruise/bleed easily. Objective:   Physical Exam  Vitals and nursing note reviewed. Constitutional:       Appearance: She is well-developed. HENT:      Head: Normocephalic and atraumatic. Neck:      Thyroid: No thyromegaly. Cardiovascular:      Rate and Rhythm: Normal rate and regular rhythm. Pulmonary:      Effort: Pulmonary effort is normal.      Breath sounds: Normal breath sounds. No wheezing. Abdominal:      General: Bowel sounds are normal. There is no distension. Palpations: Abdomen is soft. There is no mass. Tenderness: There is no abdominal tenderness. There is no guarding. Musculoskeletal:         General: Normal range of motion. Cervical back: Normal range of motion and neck supple. Skin:     General: Skin is dry. Neurological:      Mental Status: She is alert and oriented to person, place, and time.    Psychiatric:         Behavior: Behavior normal.         Thought

## 2023-06-08 ENCOUNTER — TELEPHONE (OUTPATIENT)
Dept: OBGYN CLINIC | Age: 43
End: 2023-06-08

## 2023-06-08 NOTE — TELEPHONE ENCOUNTER
Pt came in to office to schedule outpatient surgery.   Pt surgery was scheduled and patient to have nurse pre-op call 6- at 2:45Pm  Pt verbalized understanding

## 2023-06-13 NOTE — PRE-PROCEDURE INSTRUCTIONS
No answer, left message ? yes                        Unable to leave message ? When were you told to arrive at hospital ?  0730    Do you have a  ? Are you on any blood thinners ? If yes when did you stop taking ? Do you have your prep Rx filled and instruction ?n/a      Nothing to eat the day before , only clear liquids. n/a    Are you experiencing any covid symptoms ? Do you have any infections or rash we should be aware of ? Do you have the Hibiclens soap to use the night before and the morning of surgery ? Nothing to eat or drink after midnight, only a sip of water to take any medication instructed to take the night before. Wear comfortable clothing, leave any valuables at home, remove any jewelry and body piercing .

## 2023-06-13 NOTE — DISCHARGE INSTRUCTIONS
Laparoscopy: What to Expect at [address] [place] [address]  After laparoscopic surgery, you are likely to have pain for the next several days. You may have a low fever and feel tired and sick to your stomach. This is common. You should feel better after 1 to 2 weeks. This care sheet gives you a general idea about how long it will take for you to recover. But each person recovers at a different pace. Follow the steps below to get better as quickly as possible. How can you care for yourself at home? Activity    Rest when you feel tired. Getting enough sleep will help you recover. Try to walk each day. Start by walking a little more than you did the day before. Bit by bit, increase the amount you walk. Walking boosts blood flow and helps prevent pneumonia and constipation. Avoid strenuous activities, such as bicycle riding, jogging, weight lifting, or aerobic exercise, until your doctor says it is okay. Avoid lifting anything that would make you strain. This may include a child, heavy grocery bags and milk containers, a heavy briefcase or backpack, cat litter or dog food bags, or a vacuum . You may also have some shoulder or back pain. This pain is caused by the gas your doctor used to inflate your belly to help see your organs better. The pain usually lasts about 1 or 2 days. You may drive when you are no longer taking pain medicine and can quickly move your foot from the gas pedal to the brake. You must also be able to sit comfortably for a long period of time, even if you do not plan to go far. You might get caught in traffic. You may need to take a few days to a few weeks off work. It depends on the type of work you do and how you feel. You may shower 24 to 48 hours after surgery, if your doctor okays it. Pat the cut (incision) dry. Do not take a bath for the first 2 weeks, or until your doctor tells you it is okay.    Diet    If your stomach is upset, try bland, low-fat foods such

## 2023-06-14 ENCOUNTER — HOSPITAL ENCOUNTER (OUTPATIENT)
Age: 43
Setting detail: OUTPATIENT SURGERY
Discharge: HOME | End: 2023-06-14
Attending: SPECIALIST | Admitting: SPECIALIST

## 2023-06-14 VITALS
HEART RATE: 69 BPM | SYSTOLIC BLOOD PRESSURE: 123 MMHG | TEMPERATURE: 97.2 F | DIASTOLIC BLOOD PRESSURE: 73 MMHG | OXYGEN SATURATION: 97 % | RESPIRATION RATE: 12 BRPM

## 2023-06-14 DIAGNOSIS — R10.2 PELVIC PAIN: ICD-10-CM

## 2023-06-14 DIAGNOSIS — Z98.890 POST-OPERATIVE STATE: ICD-10-CM

## 2023-06-14 DIAGNOSIS — G89.18 POST-OP PAIN: Primary | ICD-10-CM

## 2023-06-14 PROCEDURE — 7100000031 HC ASPR PHASE II RECOVERY - ADDTL 15 MIN: Performed by: SPECIALIST

## 2023-06-14 PROCEDURE — 88305 TISSUE EXAM BY PATHOLOGIST: CPT

## 2023-06-14 PROCEDURE — 3600000003 HC SURGERY LEVEL 3 BASE: Performed by: SPECIALIST

## 2023-06-14 PROCEDURE — 7100000010 HC PHASE II RECOVERY - FIRST 15 MIN: Performed by: SPECIALIST

## 2023-06-14 PROCEDURE — 3700000001 HC ADD 15 MINUTES (ANESTHESIA): Performed by: SPECIALIST

## 2023-06-14 PROCEDURE — 3700000000 HC ANESTHESIA ATTENDED CARE: Performed by: SPECIALIST

## 2023-06-14 PROCEDURE — 6360000002 HC RX W HCPCS: Performed by: ANESTHESIOLOGY

## 2023-06-14 PROCEDURE — 6370000000 HC RX 637 (ALT 250 FOR IP): Performed by: ANESTHESIOLOGY

## 2023-06-14 PROCEDURE — 7100000001 HC PACU RECOVERY - ADDTL 15 MIN: Performed by: SPECIALIST

## 2023-06-14 PROCEDURE — 7100000030 HC ASPR PHASE II RECOVERY - FIRST 15 MIN: Performed by: SPECIALIST

## 2023-06-14 PROCEDURE — 2500000003 HC RX 250 WO HCPCS: Performed by: SPECIALIST

## 2023-06-14 PROCEDURE — 2720000010 HC SURG SUPPLY STERILE: Performed by: SPECIALIST

## 2023-06-14 PROCEDURE — 58662 LAPAROSCOPY EXCISE LESIONS: CPT | Performed by: SPECIALIST

## 2023-06-14 PROCEDURE — 7100000000 HC PACU RECOVERY - FIRST 15 MIN: Performed by: SPECIALIST

## 2023-06-14 PROCEDURE — 2709999900 HC NON-CHARGEABLE SUPPLY: Performed by: SPECIALIST

## 2023-06-14 PROCEDURE — 58661 LAPAROSCOPY REMOVE ADNEXA: CPT | Performed by: SPECIALIST

## 2023-06-14 PROCEDURE — 2580000003 HC RX 258: Performed by: ANESTHESIOLOGY

## 2023-06-14 PROCEDURE — 7100000011 HC PHASE II RECOVERY - ADDTL 15 MIN: Performed by: SPECIALIST

## 2023-06-14 PROCEDURE — 3600000013 HC SURGERY LEVEL 3 ADDTL 15MIN: Performed by: SPECIALIST

## 2023-06-14 RX ORDER — IBUPROFEN 600 MG/1
600 TABLET ORAL EVERY 6 HOURS PRN
Qty: 30 TABLET | Refills: 0 | OUTPATIENT
Start: 2023-06-14 | End: 2023-06-29

## 2023-06-14 RX ORDER — SCOLOPAMINE TRANSDERMAL SYSTEM 1 MG/1
PATCH, EXTENDED RELEASE TRANSDERMAL
Status: DISCONTINUED
Start: 2023-06-14 | End: 2023-06-14 | Stop reason: HOSPADM

## 2023-06-14 RX ORDER — AMOXICILLIN 250 MG
1 CAPSULE ORAL DAILY
Qty: 60 TABLET | Refills: 0 | Status: SHIPPED | OUTPATIENT
Start: 2023-06-14 | End: 2023-08-13

## 2023-06-14 RX ORDER — SODIUM CHLORIDE 0.9 % (FLUSH) 0.9 %
5-40 SYRINGE (ML) INJECTION PRN
Status: DISCONTINUED | OUTPATIENT
Start: 2023-06-14 | End: 2023-06-14 | Stop reason: HOSPADM

## 2023-06-14 RX ORDER — SIMETHICONE 80 MG
80 TABLET,CHEWABLE ORAL 4 TIMES DAILY PRN
Qty: 60 TABLET | Refills: 0 | Status: SHIPPED | OUTPATIENT
Start: 2023-06-14

## 2023-06-14 RX ORDER — SODIUM CHLORIDE, SODIUM LACTATE, POTASSIUM CHLORIDE, CALCIUM CHLORIDE 600; 310; 30; 20 MG/100ML; MG/100ML; MG/100ML; MG/100ML
INJECTION, SOLUTION INTRAVENOUS CONTINUOUS
Status: DISCONTINUED | OUTPATIENT
Start: 2023-06-14 | End: 2023-06-14 | Stop reason: HOSPADM

## 2023-06-14 RX ORDER — SODIUM CHLORIDE 0.9 % (FLUSH) 0.9 %
5-40 SYRINGE (ML) INJECTION EVERY 12 HOURS SCHEDULED
Status: DISCONTINUED | OUTPATIENT
Start: 2023-06-14 | End: 2023-06-14 | Stop reason: HOSPADM

## 2023-06-14 RX ORDER — ACETAMINOPHEN 500 MG
1000 TABLET ORAL EVERY 6 HOURS PRN
Qty: 60 TABLET | Refills: 0 | Status: SHIPPED | OUTPATIENT
Start: 2023-06-14 | End: 2023-07-14

## 2023-06-14 RX ORDER — BUPIVACAINE HYDROCHLORIDE 5 MG/ML
INJECTION, SOLUTION EPIDURAL; INTRACAUDAL PRN
Status: DISCONTINUED | OUTPATIENT
Start: 2023-06-14 | End: 2023-06-14 | Stop reason: ALTCHOICE

## 2023-06-14 RX ORDER — ACETAMINOPHEN 500 MG
1000 TABLET ORAL ONCE
Status: COMPLETED | OUTPATIENT
Start: 2023-06-14 | End: 2023-06-14

## 2023-06-14 RX ORDER — SODIUM CHLORIDE 9 MG/ML
INJECTION, SOLUTION INTRAVENOUS PRN
Status: DISCONTINUED | OUTPATIENT
Start: 2023-06-14 | End: 2023-06-14 | Stop reason: HOSPADM

## 2023-06-14 RX ORDER — LABETALOL HYDROCHLORIDE 5 MG/ML
10 INJECTION, SOLUTION INTRAVENOUS
Status: DISCONTINUED | OUTPATIENT
Start: 2023-06-14 | End: 2023-06-14 | Stop reason: HOSPADM

## 2023-06-14 RX ORDER — SCOLOPAMINE TRANSDERMAL SYSTEM 1 MG/1
1 PATCH, EXTENDED RELEASE TRANSDERMAL ONCE
Status: DISCONTINUED | OUTPATIENT
Start: 2023-06-14 | End: 2023-06-14 | Stop reason: HOSPADM

## 2023-06-14 RX ORDER — DIPHENHYDRAMINE HYDROCHLORIDE 50 MG/ML
12.5 INJECTION INTRAMUSCULAR; INTRAVENOUS
Status: DISCONTINUED | OUTPATIENT
Start: 2023-06-14 | End: 2023-06-14 | Stop reason: HOSPADM

## 2023-06-14 RX ORDER — AMOXICILLIN 250 MG
1 CAPSULE ORAL DAILY
Qty: 15 TABLET | Refills: 0 | OUTPATIENT
Start: 2023-06-14 | End: 2023-06-29

## 2023-06-14 RX ORDER — OXYCODONE HYDROCHLORIDE 5 MG/1
5 TABLET ORAL EVERY 6 HOURS PRN
Qty: 20 TABLET | Refills: 0 | OUTPATIENT
Start: 2023-06-14 | End: 2023-06-19

## 2023-06-14 RX ORDER — FENTANYL CITRATE 0.05 MG/ML
25 INJECTION, SOLUTION INTRAMUSCULAR; INTRAVENOUS EVERY 5 MIN PRN
Status: DISCONTINUED | OUTPATIENT
Start: 2023-06-14 | End: 2023-06-14 | Stop reason: HOSPADM

## 2023-06-14 RX ORDER — HYDRALAZINE HYDROCHLORIDE 20 MG/ML
10 INJECTION INTRAMUSCULAR; INTRAVENOUS
Status: DISCONTINUED | OUTPATIENT
Start: 2023-06-14 | End: 2023-06-14 | Stop reason: HOSPADM

## 2023-06-14 RX ORDER — METOCLOPRAMIDE HYDROCHLORIDE 5 MG/ML
10 INJECTION INTRAMUSCULAR; INTRAVENOUS
Status: DISCONTINUED | OUTPATIENT
Start: 2023-06-14 | End: 2023-06-14 | Stop reason: HOSPADM

## 2023-06-14 RX ORDER — HYDROCODONE BITARTRATE AND ACETAMINOPHEN 5; 325 MG/1; MG/1
1 TABLET ORAL EVERY 6 HOURS PRN
Status: COMPLETED | OUTPATIENT
Start: 2023-06-14 | End: 2023-06-14

## 2023-06-14 RX ORDER — OXYCODONE HYDROCHLORIDE 5 MG/1
5 TABLET ORAL EVERY 6 HOURS PRN
Qty: 12 TABLET | Refills: 0 | Status: SHIPPED | OUTPATIENT
Start: 2023-06-14 | End: 2023-06-19

## 2023-06-14 RX ORDER — LIDOCAINE HYDROCHLORIDE 10 MG/ML
1 INJECTION, SOLUTION EPIDURAL; INFILTRATION; INTRACAUDAL; PERINEURAL
Status: DISCONTINUED | OUTPATIENT
Start: 2023-06-14 | End: 2023-06-14 | Stop reason: HOSPADM

## 2023-06-14 RX ORDER — GABAPENTIN 300 MG/1
300 CAPSULE ORAL ONCE
Status: COMPLETED | OUTPATIENT
Start: 2023-06-14 | End: 2023-06-14

## 2023-06-14 RX ORDER — ACETAMINOPHEN 500 MG
1000 TABLET ORAL EVERY 6 HOURS PRN
Qty: 20 TABLET | Refills: 0 | OUTPATIENT
Start: 2023-06-14 | End: 2023-06-24

## 2023-06-14 RX ORDER — ONDANSETRON 2 MG/ML
4 INJECTION INTRAMUSCULAR; INTRAVENOUS
Status: DISCONTINUED | OUTPATIENT
Start: 2023-06-14 | End: 2023-06-14 | Stop reason: HOSPADM

## 2023-06-14 RX ORDER — ONDANSETRON 4 MG/1
4 TABLET, FILM COATED ORAL DAILY PRN
Qty: 30 TABLET | Refills: 0 | OUTPATIENT
Start: 2023-06-14

## 2023-06-14 RX ORDER — IBUPROFEN 600 MG/1
600 TABLET ORAL EVERY 6 HOURS PRN
Qty: 60 TABLET | Refills: 0 | Status: SHIPPED | OUTPATIENT
Start: 2023-06-14

## 2023-06-14 RX ADMIN — ACETAMINOPHEN 1000 MG: 500 TABLET ORAL at 09:06

## 2023-06-14 RX ADMIN — SODIUM CHLORIDE, POTASSIUM CHLORIDE, SODIUM LACTATE AND CALCIUM CHLORIDE: 600; 310; 30; 20 INJECTION, SOLUTION INTRAVENOUS at 09:12

## 2023-06-14 RX ADMIN — HYDROCODONE BITARTRATE AND ACETAMINOPHEN 1 TABLET: 5; 325 TABLET ORAL at 14:01

## 2023-06-14 RX ADMIN — FENTANYL CITRATE 25 MCG: 0.05 INJECTION, SOLUTION INTRAMUSCULAR; INTRAVENOUS at 13:25

## 2023-06-14 RX ADMIN — GABAPENTIN 300 MG: 300 CAPSULE ORAL at 09:06

## 2023-06-14 RX ADMIN — FENTANYL CITRATE 25 MCG: 0.05 INJECTION, SOLUTION INTRAMUSCULAR; INTRAVENOUS at 13:33

## 2023-06-14 ASSESSMENT — PAIN SCALES - GENERAL
PAINLEVEL_OUTOF10: 3
PAINLEVEL_OUTOF10: 3
PAINLEVEL_OUTOF10: 0
PAINLEVEL_OUTOF10: 6
PAINLEVEL_OUTOF10: 5
PAINLEVEL_OUTOF10: 5

## 2023-06-14 ASSESSMENT — PAIN DESCRIPTION - DESCRIPTORS
DESCRIPTORS: CRAMPING
DESCRIPTORS: SORE
DESCRIPTORS: CRAMPING
DESCRIPTORS: SORE

## 2023-06-14 ASSESSMENT — PAIN DESCRIPTION - FREQUENCY: FREQUENCY: INTERMITTENT

## 2023-06-14 ASSESSMENT — PAIN DESCRIPTION - LOCATION
LOCATION: ABDOMEN

## 2023-06-14 ASSESSMENT — PAIN DESCRIPTION - PAIN TYPE: TYPE: SURGICAL PAIN

## 2023-06-14 ASSESSMENT — PAIN - FUNCTIONAL ASSESSMENT: PAIN_FUNCTIONAL_ASSESSMENT: 0-10

## 2023-06-14 NOTE — OP NOTE
Operative Note  Department of Obstetrics and Gynecology  [facility]       Patient: Joanie Jansen   : 1980  MRN: [id number]       Acct: [de-identified]   PCP: SHANNON Tafoya NP  Date of Procedure: 23    Pre-operative Diagnosis: 36 y.o. female   Pelvic pain  S/p hysterectomy  BMI 26     Post-operative Diagnosis: severe endometriosis and right paratubal cyst     Procedure: diagnostic laparoscopy with bilateral salpingectomy and removal of right paratubal cyst     Surgeon: Dr. Daniel Hammond): Ladonna Bay, , PGY2; Mayra Cordero DO, PGY2     Anesthesia: general via ETT    Indications: Tish Abraham is a 36year old female with a history of pelvic pain. The pain is not responsive to conservative measures including NSAIDs. On , she presented to the ED complaining of pelvic pain. TVUS at that time showed midline simple cyst measuring 4.3 x 2.7 x 1.8 cm with unclear etiology from right ovary or vaginal cuff. She had a hysterectomy done in . She desires surgical management to further evaluate pelvic pain. Procedure Details   The patient was seen in the pre-op room. The risks, benefits, complications, treatment options, and expected outcomes were discussed with the patient. The patient concurred with the proposed plan, giving informed consent. The patient was taken to the Operating Room and identified as Joanie Jansen and the procedure was verified. A Time Out was held and the above information confirmed. After administration of general anesthesia, the patient was placed in  [facility] and examination under general anesthesia was performed with findings as noted below. A nelson catheter was inserted into the bladder. The patient was prepped and draped in the usual sterile fashion. Attention was then turned to the abdominal portion of the case.  An approximately 8 mm infraumbilical incision was made and using blunt dissection was

## 2023-06-14 NOTE — PROGRESS NOTES
CLINICAL PHARMACY NOTE: MEDS TO BEDS    Total # of Prescriptions Filled: 1   The following medications were delivered to the patient:  Oxycodone HCL 5mg    Additional Documentation: Patient is Eligible to Utilize Meds To Beds for Their Discharge MedicationsMedication picked up in the pharmacy by family-06/14/23 14:21 jesica

## 2023-06-14 NOTE — BRIEF OP NOTE
Brief Operative Note  Department of Obstetrics and Gynecology  [facility]     Patient: Bella Villa   : 1980  MRN: [id number]       Acct: [de-identified]   Date of Procedure: 23     Pre-operative Diagnosis: 36 y.o. female    Pelvic pain  S/p hysterectomy  BMI 26    Post-operative Diagnosis: severe endometriosis and right paratubal cyst    Procedure: diagnostic laparoscopy with bilateral salpingectomy and removal of right paratubal cyst    Surgeon: Dr. German Schmidtr): Kashmir Gross DO, PGY2; Too Reyes DO, PGY2    Anesthesia: general via ETT    Findings:  normal appearing external genitalia and vaginal mucosa, surgically absent uterus, bilateral fallopian tubes and ovaries present, right paratubal cyst, severe endometriosis in posterior cul de sac and pelvic adhesions   Total IV fluids/Blood products:  1000 ml crystalloid  Urine Output:  600 ml    Estimated blood loss:  10mL  Drains:  none  Specimens:  bilateral fallopian tubes and right paratubal cyst  Instrument and Sponge Count: Correct  Complications:  none  Condition:  stable, transferred to post anesthesia recovery    See full operative report for further details.     Kashmir Gross DO  Ob/Gyn Resident  2023, 12:59 PM

## 2023-06-14 NOTE — H&P
HISTORY and [facility] [address]       NAME:  Ben Banerjee  MRN: [id number]   YOB: 1980   Date: 6/14/2023   Age: 36 y.o. Gender: female       COMPLAINT AND PRESENT HISTORY:       Ben Banerjee is 36 y.o.,  female,  for Pre-Op Diagnosis Codes:     * Pelvic pain     Patient will be having: LAPAROSCOPY DIAGNOSTIC WITH POSSIBLE SALPINGECTOMY AND POSSIBLE OOPHORECTOMY     Office note on 6/7/23  HPI  Patient is here today following an ER visit yesterday for pelvic pain. She had a CT scan and an ultrasound done. She also had cultures done. Patient states she is still having the pain. She has had a hysterectomy, but she is having breast tenderness and hot flashes like she is going to have a period. Above reviewed with patient and concurs    UPDATE:      Patient states that she does not have a period. Since 22 yrs post hysterectomy   Patient continues to have pelvic pain most to the time, she rates at 5/10. Pt is taking motrin to help. Pt states that she sometimes has  dyspareunia, no vaginal discharge, sores or itching. Symptoms started 2 years ago. The patient is Para 2  Pt is not on any hormones at this time. Pt states that the doctor ordered, but she did not start yet. No urinary symptoms, including dysuria, hematuria, frequency or urgency. no fever or chills. Significant Medical History: mild intermittent asthma- doesn't use inhaler dailu    Pt admits to chest pain and palpitation  states she was tested to rule out cardiac, she relays that may be related to anxiety. No recent URI, SOB, fever or chills. Patient has been NPO since midnight.      Patient denies any issues with Anesthesia      PAST MEDICAL HISTORY     Past Medical History:   Diagnosis Date    COVID-19 virus RNA test result positive at limit of detection     about three weeks ago around 3/22/2023    Mild intermittent asthma without complication 96/57/8470    Uterine cancer ([facility] [address] 2003

## 2023-06-16 LAB — SURGICAL PATHOLOGY REPORT: NORMAL

## 2023-06-21 ENCOUNTER — OFFICE VISIT (OUTPATIENT)
Dept: OBGYN CLINIC | Age: 43
End: 2023-06-21

## 2023-06-21 VITALS
SYSTOLIC BLOOD PRESSURE: 116 MMHG | WEIGHT: 147.8 LBS | BODY MASS INDEX: 27.03 KG/M2 | DIASTOLIC BLOOD PRESSURE: 84 MMHG | HEART RATE: 85 BPM

## 2023-06-21 DIAGNOSIS — Z98.890 POST-OPERATIVE STATE: ICD-10-CM

## 2023-06-21 DIAGNOSIS — G89.18 POST-OP PAIN: Primary | ICD-10-CM

## 2023-06-21 DIAGNOSIS — N80.9 SEVERE ENDOMETRIOSIS: ICD-10-CM

## 2023-06-21 DIAGNOSIS — Z90.710 STATUS POST HYSTERECTOMY: ICD-10-CM

## 2023-06-21 PROCEDURE — 99024 POSTOP FOLLOW-UP VISIT: CPT | Performed by: SPECIALIST

## 2023-06-21 ASSESSMENT — ENCOUNTER SYMPTOMS
ABDOMINAL PAIN: 0
NAUSEA: 0
APNEA: 0
CONSTIPATION: 0
COUGH: 0
VOMITING: 0
ABDOMINAL DISTENTION: 0
DIARRHEA: 0
EYE PAIN: 0

## 2023-06-21 NOTE — PROGRESS NOTES
Subjective:      Patient ID: Ben Banerjee is a 36 y.o. female. Chief Complaint   Patient presents with    Post-Op Check     2 week post op lap. /84 (Site: Left Upper Arm, Position: Sitting)   Pulse 85   Wt 147 lb 12.8 oz (67 kg)   LMP  (LMP Unknown)   BMI 27.03 kg/m²   No LMP recorded (lmp unknown). Patient has had a hysterectomy. No obstetric history on file. Past Medical History:   Diagnosis Date    COVID-19 virus RNA test result positive at limit of detection     about three weeks ago around 3/22/2023    Mild intermittent asthma without complication 04/30/9880    Uterine cancer ([facility] [address] 2003    had hyst     Current Outpatient Medications Ordered in Epic   Medication Sig Dispense Refill    senna-docusate (SENOKOT S) 8.6-50 MG per tablet Take 1 tablet by mouth daily for 60 doses 60 tablet 0    simethicone (MYLICON) 80 MG chewable tablet Take 1 tablet by mouth 4 times daily as needed for Flatulence 60 tablet 0    acetaminophen (TYLENOL) 500 MG tablet Take 2 tablets by mouth every 6 hours as needed for Pain 60 tablet 0    ibuprofen (ADVIL;MOTRIN) 600 MG tablet Take 1 tablet by mouth every 6 hours as needed for Pain 60 tablet 0    Norgestim-Eth Estrad Triphasic (ORTHO TRI-CYCLEN, 28,) 0.18/0.215/0.25 MG-35 MCG TABS Take 1 tablet by mouth daily for 28 days 28 tablet 2    albuterol sulfate HFA (VENTOLIN HFA) 108 (90 Base) MCG/ACT inhaler Inhale 2 puffs into the lungs 4 times daily as needed for Wheezing or Shortness of Breath 54 g 1    butalbital-acetaminophen-caffeine (FIORICET, ESGIC) -40 MG per tablet Take 1 tablet by mouth every 4 hours as needed for Headaches 30 tablet 0     No current Epic-ordered facility-administered medications on file.      Problem List Items Addressed This Visit       Status post hysterectomy    S/p diagnostic laparoscopy, bilateral salpingectomy 6/14/23    Post-op pain - Primary     Other Visit Diagnoses       Severe endometriosis              Allergies

## 2023-06-22 ENCOUNTER — OFFICE VISIT (OUTPATIENT)
Dept: FAMILY MEDICINE CLINIC | Age: 43
End: 2023-06-22

## 2023-06-22 VITALS
BODY MASS INDEX: 27.05 KG/M2 | HEART RATE: 86 BPM | OXYGEN SATURATION: 97 % | DIASTOLIC BLOOD PRESSURE: 80 MMHG | WEIGHT: 147 LBS | SYSTOLIC BLOOD PRESSURE: 118 MMHG | HEIGHT: 62 IN | RESPIRATION RATE: 20 BRPM

## 2023-06-22 DIAGNOSIS — E55.9 VITAMIN D DEFICIENCY: ICD-10-CM

## 2023-06-22 DIAGNOSIS — R53.83 OTHER FATIGUE: Primary | ICD-10-CM

## 2023-06-22 DIAGNOSIS — R73.01 ELEVATED FASTING GLUCOSE: ICD-10-CM

## 2023-06-22 DIAGNOSIS — Z11.4 ENCOUNTER FOR SCREENING FOR HIV: ICD-10-CM

## 2023-06-22 DIAGNOSIS — Z11.59 ENCOUNTER FOR HEPATITIS C SCREENING TEST FOR LOW RISK PATIENT: ICD-10-CM

## 2023-06-22 PROBLEM — N80.9 SEVERE ENDOMETRIOSIS: Status: ACTIVE | Noted: 2023-06-22

## 2023-06-22 PROCEDURE — 99214 OFFICE O/P EST MOD 30 MIN: CPT | Performed by: NURSE PRACTITIONER

## 2023-06-22 NOTE — PROGRESS NOTES
Abhishek Torrez (:  1980) is a 36 y.o. female,Established patient, here for evaluation of the following chief complaint(s):  Follow Up After Procedure (States she needs blood work for fatigue and dehydrated. )         ASSESSMENT/PLAN:  1. Other fatigue  -     CBC with Auto Differential; Future  -     Comprehensive Metabolic Panel; Future  -     TSH With Reflex Ft4; Future  2. Vitamin D deficiency  -     Vitamin D 25 Hydroxy; Future  3. Encounter for screening for HIV  -     HIV Screen; Future  4. Encounter for hepatitis C screening test for low risk patient  -     Hepatitis C Antibody; Future  5. Elevated fasting glucose  -     Hemoglobin A1C; Future    Fatigue: Rx for CBC, CMP, TSH - follow up pending lab results. Discussed she has been through several medical episodes over the last few weeks and needs to get plenty of rest.  Encourage fluids, avoid use of caffeine. Return if symptoms worsen or fail to improve. Subjective   SUBJECTIVE/OBJECTIVE:  Presents for acute visit to discuss fatigue and ER follow up    Tested positive for covid-19 pneumonia 2023. Took trip to [place] a few weeks after getting better. Went to [facility] ER 2023 for abdominal pain. Referred to GYN based off of findings on CT. Had follow up with Dr. Romi Easley the next day - started on birth control pills for hot flashes, abdominal pain. Reports abdominal pain persisted, had diagnostic laparoscopy with bilateral salpingectomy and removal of right paratubal cyst 2023 at [facility].    Post op follow up with GYN completed 2023 - was told everything was healing well and to follow up with PCP for concerns of fatigue and possible dehydration     Reports she feels like she could sleep every second of every day  Feels exhausted and 'dry'  Reports she drinks 1-2 cups of coffee a day and several bottles of water  No fever, chills, dizziness/lightheadedness, chest pain       Review of Systems

## 2023-06-23 ASSESSMENT — ENCOUNTER SYMPTOMS
EYE PAIN: 0
CHEST TIGHTNESS: 0
DIARRHEA: 0
BACK PAIN: 0
COLOR CHANGE: 0
SINUS PRESSURE: 0
ABDOMINAL PAIN: 0
SINUS PAIN: 0
VOMITING: 0
CONSTIPATION: 0
SHORTNESS OF BREATH: 0
NAUSEA: 0

## 2023-07-06 ENCOUNTER — OFFICE VISIT (OUTPATIENT)
Dept: OBGYN CLINIC | Age: 43
End: 2023-07-06

## 2023-07-06 VITALS
WEIGHT: 150 LBS | HEIGHT: 62 IN | HEART RATE: 77 BPM | DIASTOLIC BLOOD PRESSURE: 66 MMHG | SYSTOLIC BLOOD PRESSURE: 102 MMHG | BODY MASS INDEX: 27.6 KG/M2

## 2023-07-06 DIAGNOSIS — N80.9 SEVERE ENDOMETRIOSIS: ICD-10-CM

## 2023-07-06 DIAGNOSIS — Z98.890 POST-OPERATIVE STATE: Primary | ICD-10-CM

## 2023-07-06 DIAGNOSIS — G89.18 POST-OP PAIN: ICD-10-CM

## 2023-07-06 PROCEDURE — 99024 POSTOP FOLLOW-UP VISIT: CPT | Performed by: SPECIALIST

## 2023-07-06 ASSESSMENT — ENCOUNTER SYMPTOMS
CONSTIPATION: 0
NAUSEA: 0
ABDOMINAL DISTENTION: 0
ABDOMINAL PAIN: 0
VOMITING: 0
APNEA: 0
DIARRHEA: 0
COUGH: 0
EYE PAIN: 0

## 2023-07-06 NOTE — PROGRESS NOTES
Samples Linda Snowden were given to the patient, quantity 2 boxes, Lot Number [id number].  Exp. 01/28/25
paratubal cyst.  She complains of having severe pain today that she rates 10/10. The pain gets worse at night. She had a hysterectomy in 2003. Review of Systems   Constitutional:  Negative for activity change, appetite change and fever. HENT:  Negative for ear discharge and ear pain. Eyes:  Negative for pain and visual disturbance. Respiratory:  Negative for apnea and cough. Cardiovascular:  Negative for chest pain, palpitations and leg swelling. Gastrointestinal:  Negative for abdominal distention, abdominal pain, constipation, diarrhea, nausea and vomiting. Endocrine: Negative. Genitourinary:  Negative for difficulty urinating, dysuria, menstrual problem and pelvic pain. Postoperative pain   Musculoskeletal:  Negative for neck pain and neck stiffness. Skin: Negative. Neurological:  Negative for light-headedness and numbness. Hematological: Negative. Does not bruise/bleed easily. Objective:   Physical Exam  Vitals and nursing note reviewed. Constitutional:       Appearance: She is well-developed. Abdominal:      Comments: Incision healing well without signs of infection    Skin:     General: Skin is dry. Neurological:      Mental Status: She is alert and oriented to person, place, and time. Psychiatric:         Behavior: Behavior normal.         Thought Content: Thought content normal.       Assessment:      Patient 3 weeks post laparoscopy with bilateral salpingectomy with pain. Explained to patient that her pain may be related to severe endometriosis. Will treat with [place] Stalls. 2 weeks treatment of samples were provided to patient. Patient will start treatment and return in 2 weeks. Plan:      Orilissa samples provided    Appointment 2 weeks    Maria Teresa THOMAS am scribing for, and in the presence of Dr. Deb Trammell.    Electronically signed by: Maria Teresa Beltran 7/6/23 4:38 PM       I agree to the above documentation placed by my scribe Chandan Raygoza

## 2023-07-14 PROBLEM — Z98.890 POST-OPERATIVE STATE: Status: RESOLVED | Noted: 2023-06-14 | Resolved: 2023-07-14

## 2023-07-20 ENCOUNTER — OFFICE VISIT (OUTPATIENT)
Dept: OBGYN CLINIC | Age: 43
End: 2023-07-20

## 2023-07-20 VITALS
HEART RATE: 98 BPM | BODY MASS INDEX: 27.79 KG/M2 | WEIGHT: 151 LBS | SYSTOLIC BLOOD PRESSURE: 122 MMHG | HEIGHT: 62 IN | DIASTOLIC BLOOD PRESSURE: 74 MMHG

## 2023-07-20 DIAGNOSIS — N80.9 SEVERE ENDOMETRIOSIS: ICD-10-CM

## 2023-07-20 DIAGNOSIS — G89.18 POST-OP PAIN: ICD-10-CM

## 2023-07-20 DIAGNOSIS — Z98.890 POST-OPERATIVE STATE: Primary | ICD-10-CM

## 2023-07-20 PROCEDURE — 99024 POSTOP FOLLOW-UP VISIT: CPT | Performed by: SPECIALIST

## 2023-07-20 RX ORDER — ELAGOLIX 200 MG/1
1 TABLET, FILM COATED ORAL 2 TIMES DAILY
Qty: 60 TABLET | Refills: 2 | Status: SHIPPED | OUTPATIENT
Start: 2023-07-20 | End: 2023-07-20

## 2023-07-20 RX ORDER — ELAGOLIX 200 MG/1
1 TABLET, FILM COATED ORAL 2 TIMES DAILY
Qty: 60 TABLET | Refills: 4 | Status: SHIPPED | OUTPATIENT
Start: 2023-07-20

## 2023-07-20 RX ORDER — VALACYCLOVIR HYDROCHLORIDE 500 MG/1
500 TABLET, FILM COATED ORAL 2 TIMES DAILY
Qty: 60 TABLET | Refills: 11 | Status: SHIPPED | OUTPATIENT
Start: 2023-07-20 | End: 2023-08-19

## 2023-07-24 ASSESSMENT — ENCOUNTER SYMPTOMS
VOMITING: 0
APNEA: 0
ABDOMINAL PAIN: 0
DIARRHEA: 0
EYE PAIN: 0
CONSTIPATION: 0
NAUSEA: 0
COUGH: 0
ABDOMINAL DISTENTION: 0

## 2023-08-19 PROBLEM — Z98.890 POST-OPERATIVE STATE: Status: RESOLVED | Noted: 2023-06-14 | Resolved: 2023-08-19

## 2023-08-29 ENCOUNTER — OFFICE VISIT (OUTPATIENT)
Dept: OBGYN CLINIC | Age: 43
End: 2023-08-29

## 2023-08-29 VITALS
SYSTOLIC BLOOD PRESSURE: 110 MMHG | BODY MASS INDEX: 27.6 KG/M2 | HEIGHT: 62 IN | DIASTOLIC BLOOD PRESSURE: 70 MMHG | WEIGHT: 150 LBS

## 2023-08-29 DIAGNOSIS — G89.29 CHRONIC PELVIC PAIN IN FEMALE: ICD-10-CM

## 2023-08-29 DIAGNOSIS — R10.2 CHRONIC PELVIC PAIN IN FEMALE: ICD-10-CM

## 2023-08-29 DIAGNOSIS — N80.9 SEVERE ENDOMETRIOSIS: Primary | ICD-10-CM

## 2023-08-29 PROCEDURE — 99213 OFFICE O/P EST LOW 20 MIN: CPT | Performed by: SPECIALIST

## 2023-08-29 RX ORDER — ELAGOLIX 200 MG/1
1 TABLET, FILM COATED ORAL
Qty: 28 TABLET | Refills: 0 | Status: SHIPPED | COMMUNITY
Start: 2023-08-29 | End: 2023-09-26

## 2023-08-29 RX ORDER — IBUPROFEN 800 MG/1
800 TABLET ORAL EVERY 8 HOURS PRN
Qty: 24 TABLET | Refills: 0 | Status: SHIPPED | OUTPATIENT
Start: 2023-08-29

## 2023-08-29 ASSESSMENT — ENCOUNTER SYMPTOMS
NAUSEA: 0
CONSTIPATION: 0
ABDOMINAL PAIN: 0
COUGH: 0
DIARRHEA: 0
VOMITING: 0
EYE PAIN: 0
ABDOMINAL DISTENTION: 0
APNEA: 0

## 2023-08-29 NOTE — PROGRESS NOTES
Samples of Orilissa 200 mg were given to the patient, quantity 4 boxes, Lot Number [id number]. Expires 01.2025.
visual disturbance. Respiratory:  Negative for apnea and cough. Cardiovascular:  Negative for chest pain, palpitations and leg swelling. Gastrointestinal:  Negative for abdominal distention, abdominal pain, constipation, diarrhea, nausea and vomiting. Endocrine: Negative. Genitourinary:  Positive for pelvic pain. Negative for difficulty urinating, dysuria and menstrual problem. Musculoskeletal:  Negative for neck pain and neck stiffness. Skin: Negative. Neurological:  Negative for light-headedness and numbness. Hematological: Negative. Does not bruise/bleed easily. Objective:   Physical Exam  Vitals and nursing note reviewed. Exam conducted with a chaperone present. Constitutional:       Appearance: She is well-developed. Skin:     General: Skin is dry. Neurological:      Mental Status: She is alert and oriented to person, place, and time. Psychiatric:         Behavior: Behavior normal.         Thought Content: Thought content normal.       Assessment:      Patient with known severe endometriosis with persistent pelvic pain. Treatment options were discussed including bilateral oophorectomy. Patient does not want her ovaries removed at this time. Patient will be provided samples of Orilissa and she was encouraged to take the medication. She was also told she may continue to use Motrin.       Plan:      Orders Placed This Encounter   Medications    Elagolix Sodium (ORILISSA) 200 MG TABS     Sig: Take 1 tablet by mouth daily (before dinner) for 28 days     Dispense:  28 tablet     Refill:  0     Order Specific Question:   Expiration Date     Answer:   1/28/2025     Order Specific Question:   Lot#     Answer:   [id number]     Order Specific Question:        Answer:   Olga Faust     Order Specific Question:   NDC#     Answer:   [id number]    ibuprofen (ADVIL;MOTRIN) 800 MG tablet     Sig: Take 1 tablet by mouth every 8 hours as needed for Pain     Dispense:  24 tablet     Refill:

## 2023-09-12 ENCOUNTER — TELEPHONE (OUTPATIENT)
Dept: OBGYN CLINIC | Age: 43
End: 2023-09-12

## 2023-09-12 RX ORDER — METRONIDAZOLE 500 MG/1
500 TABLET ORAL 2 TIMES DAILY
Qty: 14 TABLET | Refills: 0 | Status: SHIPPED | OUTPATIENT
Start: 2023-09-12 | End: 2023-09-19

## 2023-09-12 RX ORDER — FLUCONAZOLE 100 MG/1
100 TABLET ORAL DAILY
Qty: 7 TABLET | Refills: 0 | Status: SHIPPED | OUTPATIENT
Start: 2023-09-12 | End: 2023-09-19

## 2023-12-12 ENCOUNTER — APPOINTMENT (OUTPATIENT)
Dept: CT IMAGING | Age: 43
End: 2023-12-12
Payer: COMMERCIAL

## 2023-12-12 ENCOUNTER — APPOINTMENT (OUTPATIENT)
Dept: MRI IMAGING | Age: 43
End: 2023-12-12
Payer: COMMERCIAL

## 2023-12-12 ENCOUNTER — HOSPITAL ENCOUNTER (EMERGENCY)
Age: 43
Discharge: HOME | End: 2023-12-12
Attending: EMERGENCY MEDICINE
Payer: COMMERCIAL

## 2023-12-12 VITALS
WEIGHT: 145 LBS | DIASTOLIC BLOOD PRESSURE: 84 MMHG | SYSTOLIC BLOOD PRESSURE: 115 MMHG | OXYGEN SATURATION: 100 % | HEART RATE: 83 BPM | BODY MASS INDEX: 26.52 KG/M2 | TEMPERATURE: 98.4 F | RESPIRATION RATE: 16 BRPM

## 2023-12-12 DIAGNOSIS — S16.1XXA STRAIN OF NECK MUSCLE, INITIAL ENCOUNTER: ICD-10-CM
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
## 2023-12-12 LAB
ANION GAP SERPL CALCULATED.3IONS-SCNC: 11 MMOL/L (ref 9–17)
B-HCG SERPL EIA 3RD IS-ACNC: <1 MIU/ML
BASOPHILS # BLD: 0.04 K/UL (ref 0–0.2)
BASOPHILS NFR BLD: 1 % (ref 0–2)
BUN SERPL-MCNC: 16 MG/DL (ref 6–20)
CALCIUM SERPL-MCNC: 8.9 MG/DL (ref 8.6–10.4)
CHLORIDE SERPL-SCNC: 107 MMOL/L (ref 98–107)
CO2 SERPL-SCNC: 21 MMOL/L (ref 20–31)
CREAT SERPL-MCNC: 0.7 MG/DL (ref 0.5–0.9)
EOSINOPHIL # BLD: 0.19 K/UL (ref 0–0.44)
EOSINOPHILS RELATIVE PERCENT: 2 % (ref 1–4)
ERYTHROCYTE [DISTWIDTH] IN BLOOD BY AUTOMATED COUNT: 13.4 % (ref 11.8–14.4)
GFR SERPL CREATININE-BSD FRML MDRD: >60 ML/MIN/1.73M2
GLUCOSE SERPL-MCNC: 104 MG/DL (ref 70–99)
HCT VFR BLD AUTO: 41.3 % (ref 36.3–47.1)
HGB BLD-MCNC: 13.2 G/DL (ref 11.9–15.1)
IMM GRANULOCYTES # BLD AUTO: <0.03 K/UL (ref 0–0.3)
IMM GRANULOCYTES NFR BLD: 0 %
LYMPHOCYTES NFR BLD: 2.57 K/UL (ref 1.1–3.7)
LYMPHOCYTES RELATIVE PERCENT: 33 % (ref 24–43)
MCH RBC QN AUTO: 27.2 PG (ref 25.2–33.5)
MCHC RBC AUTO-ENTMCNC: 32 G/DL (ref 28.4–34.8)
MCV RBC AUTO: 85.2 FL (ref 82.6–102.9)
MONOCYTES NFR BLD: 0.58 K/UL (ref 0.1–1.2)
MONOCYTES NFR BLD: 7 % (ref 3–12)
NEUTROPHILS NFR BLD: 57 % (ref 36–65)
NEUTS SEG NFR BLD: 4.39 K/UL (ref 1.5–8.1)
NRBC BLD-RTO: 0 PER 100 WBC
PLATELET # BLD AUTO: 294 K/UL (ref 138–453)
PMV BLD AUTO: 9.9 FL (ref 8.1–13.5)
POTASSIUM SERPL-SCNC: 3.8 MMOL/L (ref 3.7–5.3)
RBC # BLD AUTO: 4.85 M/UL (ref 3.95–5.11)
SODIUM SERPL-SCNC: 139 MMOL/L (ref 135–144)
TROPONIN I SERPL HS-MCNC: <6 NG/L (ref 0–14)
WBC OTHER # BLD: 7.8 K/UL (ref 3.5–11.3)

## 2023-12-12 PROCEDURE — 96376 TX/PRO/DX INJ SAME DRUG ADON: CPT

## 2023-12-12 PROCEDURE — 71260 CT THORAX DX C+: CPT

## 2023-12-12 PROCEDURE — 72141 MRI NECK SPINE W/O DYE: CPT

## 2023-12-12 PROCEDURE — 6370000000 HC RX 637 (ALT 250 FOR IP): Performed by: EMERGENCY MEDICINE

## 2023-12-12 PROCEDURE — 6360000004 HC RX CONTRAST MEDICATION: Performed by: EMERGENCY MEDICINE

## 2023-12-12 PROCEDURE — 84484 ASSAY OF TROPONIN QUANT: CPT

## 2023-12-12 PROCEDURE — 72125 CT NECK SPINE W/O DYE: CPT

## 2023-12-12 PROCEDURE — 80048 BASIC METABOLIC PNL TOTAL CA: CPT

## 2023-12-12 PROCEDURE — 96374 THER/PROPH/DIAG INJ IV PUSH: CPT

## 2023-12-12 PROCEDURE — 93005 ELECTROCARDIOGRAM TRACING: CPT | Performed by: EMERGENCY MEDICINE

## 2023-12-12 PROCEDURE — 99285 EMERGENCY DEPT VISIT HI MDM: CPT

## 2023-12-12 PROCEDURE — 84702 CHORIONIC GONADOTROPIN TEST: CPT

## 2023-12-12 PROCEDURE — 70450 CT HEAD/BRAIN W/O DYE: CPT

## 2023-12-12 PROCEDURE — 85025 COMPLETE CBC W/AUTO DIFF WBC: CPT

## 2023-12-12 PROCEDURE — 6360000002 HC RX W HCPCS: Performed by: EMERGENCY MEDICINE

## 2023-12-12 RX ORDER — FENTANYL CITRATE 50 UG/ML
25 INJECTION, SOLUTION INTRAMUSCULAR; INTRAVENOUS ONCE
Status: COMPLETED | OUTPATIENT
Start: 2023-12-12 | End: 2023-12-12

## 2023-12-12 RX ORDER — ACETAMINOPHEN 325 MG/1
325 TABLET ORAL EVERY 6 HOURS PRN
Qty: 28 TABLET | Refills: 0 | Status: SHIPPED | OUTPATIENT
Start: 2023-12-12 | End: 2023-12-19

## 2023-12-12 RX ORDER — CYCLOBENZAPRINE HCL 10 MG
10 TABLET ORAL ONCE
Status: COMPLETED | OUTPATIENT
Start: 2023-12-12 | End: 2023-12-12

## 2023-12-12 RX ORDER — CYCLOBENZAPRINE HCL 5 MG
5 TABLET ORAL 2 TIMES DAILY PRN
Qty: 10 TABLET | Refills: 0 | Status: SHIPPED | OUTPATIENT
Start: 2023-12-12 | End: 2023-12-17

## 2023-12-12 RX ORDER — IBUPROFEN 600 MG/1
600 TABLET ORAL 3 TIMES DAILY PRN
Qty: 21 TABLET | Refills: 0 | Status: SHIPPED | OUTPATIENT
Start: 2023-12-12 | End: 2023-12-19

## 2023-12-12 RX ADMIN — CYCLOBENZAPRINE 10 MG: 10 TABLET, FILM COATED ORAL at 19:27

## 2023-12-12 RX ADMIN — IOPAMIDOL 75 ML: 755 INJECTION, SOLUTION INTRAVENOUS at 17:52

## 2023-12-12 RX ADMIN — FENTANYL CITRATE 25 MCG: 50 INJECTION, SOLUTION INTRAMUSCULAR; INTRAVENOUS at 15:38

## 2023-12-12 RX ADMIN — FENTANYL CITRATE 25 MCG: 50 INJECTION, SOLUTION INTRAMUSCULAR; INTRAVENOUS at 18:03

## 2023-12-12 ASSESSMENT — PAIN DESCRIPTION - LOCATION: LOCATION: BACK

## 2023-12-12 ASSESSMENT — PAIN SCALES - GENERAL
PAINLEVEL_OUTOF10: 9
PAINLEVEL_OUTOF10: 10
PAINLEVEL_OUTOF10: 10

## 2023-12-12 ASSESSMENT — ENCOUNTER SYMPTOMS
ABDOMINAL PAIN: 0
BACK PAIN: 1
SHORTNESS OF BREATH: 0

## 2023-12-12 NOTE — ED NOTES
Report received from [facility], all questions answered. Pt remains in c collar. Pt is A&O and speaking in complete sentences. Pt being placed on full cardiac monitor.  Care ongoing     Fartun Munoz RN  12/12/23 [id number]

## 2023-12-12 NOTE — ED NOTES
The following labs were labeled with appropriate pt sticker and tubed to lab:     [x] Blue     [x] Lavender   [] on ice  [x] Green/yellow  [x] Green/black [] on ice  [] Alison Hotter  [] on ice  [x] Yellow  [] Red  [] Pink  [] Type/ Screen  [] ABG  [] VBG    [] COVID-19 swab    [] Rapid  [] PCR  [] Flu swab  [] Peds Viral Panel     [] Urine Sample  [] Fecal Sample  [] Pelvic Cultures  [] Blood Cultures  [] X 2  [] STREP Cultures  [] Wound Cultures       Kiya Singh RN  12/12/23 2756
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
## 2023-12-12 NOTE — ED NOTES
Pt in [facility], [facility], RN  12/12/23 2626

## 2023-12-13 LAB
EKG ATRIAL RATE: 88 BPM
EKG P AXIS: 12 DEGREES
EKG P-R INTERVAL: 164 MS
EKG Q-T INTERVAL: 356 MS
EKG QRS DURATION: 66 MS
EKG QTC CALCULATION (BAZETT): 430 MS
EKG R AXIS: 61 DEGREES
EKG T AXIS: 31 DEGREES
EKG VENTRICULAR RATE: 88 BPM

## 2023-12-13 PROCEDURE — 93010 ELECTROCARDIOGRAM REPORT: CPT | Performed by: INTERNAL MEDICINE
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
## 2024-04-15 ENCOUNTER — OFFICE VISIT (OUTPATIENT)
Dept: OBGYN CLINIC | Age: 44
End: 2024-04-15

## 2024-04-15 ENCOUNTER — HOSPITAL ENCOUNTER (OUTPATIENT)
Age: 44
Setting detail: SPECIMEN
Discharge: HOME | End: 2024-04-15

## 2024-04-15 VITALS
BODY MASS INDEX: 34.04 KG/M2 | SYSTOLIC BLOOD PRESSURE: 120 MMHG | DIASTOLIC BLOOD PRESSURE: 82 MMHG | WEIGHT: 185 LBS | HEART RATE: 96 BPM | HEIGHT: 62 IN

## 2024-04-15 DIAGNOSIS — Z85.42 HISTORY OF UTERINE CANCER: ICD-10-CM

## 2024-04-15 DIAGNOSIS — N80.9 SEVERE ENDOMETRIOSIS: ICD-10-CM

## 2024-04-15 DIAGNOSIS — Z01.419 WELL WOMAN EXAM: Primary | ICD-10-CM

## 2024-04-15 DIAGNOSIS — N76.0 ACUTE VAGINITIS: ICD-10-CM

## 2024-04-15 DIAGNOSIS — Z90.710 STATUS POST HYSTERECTOMY: ICD-10-CM

## 2024-04-15 DIAGNOSIS — N63.10 MASS OF RIGHT BREAST, UNSPECIFIED QUADRANT: ICD-10-CM

## 2024-04-15 DIAGNOSIS — N64.4 BREAST PAIN, RIGHT: ICD-10-CM

## 2024-04-15 DIAGNOSIS — Z11.51 SCREENING FOR HPV (HUMAN PAPILLOMAVIRUS): ICD-10-CM

## 2024-04-15 RX ORDER — METRONIDAZOLE 7.5 MG/G
GEL VAGINAL
Qty: 1 EACH | Refills: 0 | Status: SHIPPED | OUTPATIENT
Start: 2024-04-15

## 2024-04-15 ASSESSMENT — PATIENT HEALTH QUESTIONNAIRE - PHQ9
2. FEELING DOWN, DEPRESSED OR HOPELESS: SEVERAL DAYS
1. LITTLE INTEREST OR PLEASURE IN DOING THINGS: SEVERAL DAYS
SUM OF ALL RESPONSES TO PHQ QUESTIONS 1-9: 2
SUM OF ALL RESPONSES TO PHQ9 QUESTIONS 1 & 2: 2
SUM OF ALL RESPONSES TO PHQ QUESTIONS 1-9: 2

## 2024-04-15 NOTE — PROGRESS NOTES
Screening for HPV (human papillomavirus)  PAP SMEAR      3. Severe endometriosis        4. Status post hysterectomy        5. Breast pain, right  PETRA DIGITAL DIAGNOSTIC W OR WO CAD BILATERAL    US BREAST COMPLETE RIGHT      6. History of uterine cancer        7. Mass of right breast, unspecified quadrant        8. Acute vaginitis  Vaginitis DNA Probe    metroNIDAZOLE (METROGEL) 0.75 % vaginal gel                      PLAN:  - Discussed new papsmear guidelines.   - Birth control Discussed.  - Smoking risk factors Discussed  - Diet and exercise reviewed.   - Routine healthmaintenance per patients PCP.  - Return to clinic in 1 year or earlier with questions, problems, concerns.  Return for 1 year for Annual and as needed.        Electronically signed by SHANNON Preciado CNP on 4/15/2024 at 12:07 PM

## 2024-04-16 DIAGNOSIS — N76.0 ACUTE VAGINITIS: ICD-10-CM

## 2024-04-16 LAB
CANDIDA SPECIES: NEGATIVE
GARDNERELLA VAGINALIS: POSITIVE
SOURCE: ABNORMAL
TRICHOMONAS: NEGATIVE

## 2024-04-19 LAB
HPV I/H RISK 4 DNA CVX QL NAA+PROBE: NOT DETECTED
HPV SAMPLE: NORMAL
HPV, INTERPRETATION: NORMAL
HPV16 DNA CVX QL NAA+PROBE: NOT DETECTED
HPV18 DNA CVX QL NAA+PROBE: NOT DETECTED
SPECIMEN DESCRIPTION: NORMAL

## 2024-04-25 LAB — CYTOLOGY REPORT: NORMAL

## 2024-05-01 ENCOUNTER — OFFICE VISIT (OUTPATIENT)
Dept: FAMILY MEDICINE CLINIC | Age: 44
End: 2024-05-01
Payer: COMMERCIAL

## 2024-05-01 VITALS
HEIGHT: 62 IN | HEART RATE: 80 BPM | SYSTOLIC BLOOD PRESSURE: 122 MMHG | RESPIRATION RATE: 18 BRPM | DIASTOLIC BLOOD PRESSURE: 84 MMHG | OXYGEN SATURATION: 96 % | WEIGHT: 156 LBS | BODY MASS INDEX: 28.71 KG/M2

## 2024-05-01 DIAGNOSIS — K59.00 CONSTIPATION, UNSPECIFIED CONSTIPATION TYPE: Primary | ICD-10-CM

## 2024-05-01 PROCEDURE — G8417 CALC BMI ABV UP PARAM F/U: HCPCS | Performed by: NURSE PRACTITIONER

## 2024-05-01 PROCEDURE — G8427 DOCREV CUR MEDS BY ELIG CLIN: HCPCS | Performed by: NURSE PRACTITIONER

## 2024-05-01 PROCEDURE — 99213 OFFICE O/P EST LOW 20 MIN: CPT | Performed by: NURSE PRACTITIONER

## 2024-05-01 PROCEDURE — 1036F TOBACCO NON-USER: CPT | Performed by: NURSE PRACTITIONER

## 2024-05-01 RX ORDER — DOCUSATE SODIUM 100 MG/1
100 CAPSULE, LIQUID FILLED ORAL 2 TIMES DAILY
Qty: 60 CAPSULE | Refills: 0 | Status: SHIPPED | OUTPATIENT
Start: 2024-05-01 | End: 2024-05-31

## 2024-05-01 RX ORDER — SENNOSIDES A AND B 8.6 MG/1
1 TABLET, FILM COATED ORAL 2 TIMES DAILY
Qty: 60 TABLET | Refills: 1 | Status: SHIPPED | OUTPATIENT
Start: 2024-05-01 | End: 2024-06-30

## 2024-05-01 ASSESSMENT — ENCOUNTER SYMPTOMS
NAUSEA: 0
ABDOMINAL PAIN: 1
VOMITING: 0
BLOATING: 1
CONSTIPATION: 1

## 2024-05-01 NOTE — PROGRESS NOTES
file.       Subjective   Presents with son for acute visit         Constipation  This is a recurrent problem. The current episode started more than 1 year ago (Worsening symptoms over the last 3 weeks). The problem has been waxing and waning since onset. Her stool frequency is 1 time per week or less. The stool is described as firm, formed and pellet like. The patient is on a high fiber diet. She Exercises regularly. There has Been adequate water intake. Associated symptoms include abdominal pain, anorexia and bloating. Pertinent negatives include no melena, nausea or vomiting. Risk factors include travel (Drives to [place] every other week - 10 hour car rides.). She has tried laxatives for the symptoms. The treatment provided moderate relief. Her past medical history is significant for abdominal surgery.       Review of Systems   Gastrointestinal:  Positive for abdominal pain, anorexia, bloating and constipation. Negative for melena, nausea and vomiting.          Objective   Physical Exam  Vitals and nursing note reviewed.   Constitutional:       Appearance: Normal appearance.   HENT:      Head: Normocephalic.      Right Ear: Hearing normal.      Left Ear: Hearing normal.      Nose: Nose normal.      Mouth/Throat:      Mouth: Mucous membranes are moist.      Pharynx: Oropharynx is clear.   Eyes:      Extraocular Movements: Extraocular movements intact.      Conjunctiva/sclera: Conjunctivae normal.      Pupils: Pupils are equal, round, and reactive to light.   Cardiovascular:      Rate and Rhythm: Normal rate and regular rhythm.      Pulses: Normal pulses.      Heart sounds: Normal heart sounds.   Pulmonary:      Effort: Pulmonary effort is normal.      Breath sounds: Normal breath sounds.   Abdominal:      General: Abdomen is flat. Bowel sounds are normal. There is distension.      Palpations: Abdomen is soft.      Tenderness: There is no abdominal tenderness.   Musculoskeletal:         General: Normal range of

## 2024-05-02 ENCOUNTER — TELEPHONE (OUTPATIENT)
Dept: FAMILY MEDICINE CLINIC | Age: 44
End: 2024-05-02

## 2024-05-02 DIAGNOSIS — T63.441A ANAPHYLAXIS DUE TO HONEY BEE VENOM: Primary | ICD-10-CM

## 2024-05-02 NOTE — TELEPHONE ENCOUNTER
Patient called stating the pharmacy does not have medication that were sent in yesterday.    Spoke with pharmacy and they stated they are not covered because patient has to buy otc.    Patient informed.  States she is going to talk to her insurance.

## 2024-05-03 RX ORDER — EPINEPHRINE 0.3 MG/.3ML
0.3 INJECTION SUBCUTANEOUS ONCE
Qty: 0.6 ML | Refills: 1 | Status: SHIPPED | OUTPATIENT
Start: 2024-05-03 | End: 2024-05-03

## 2024-05-06 ENCOUNTER — HOSPITAL ENCOUNTER (OUTPATIENT)
Dept: MAMMOGRAPHY | Age: 44
Discharge: HOME | End: 2024-05-08

## 2024-05-06 ENCOUNTER — HOSPITAL ENCOUNTER (OUTPATIENT)
Dept: ULTRASOUND IMAGING | Age: 44
Discharge: HOME | End: 2024-05-08

## 2024-05-06 VITALS — WEIGHT: 150 LBS | HEIGHT: 62 IN | BODY MASS INDEX: 27.6 KG/M2

## 2024-05-06 DIAGNOSIS — N64.4 BREAST PAIN, RIGHT: ICD-10-CM

## 2024-05-06 PROCEDURE — G0279 TOMOSYNTHESIS, MAMMO: HCPCS

## 2024-05-06 PROCEDURE — 76642 ULTRASOUND BREAST LIMITED: CPT

## (undated) DEVICE — SINGLE-USE CUT/COAGULATION HANDPIECE FOR THE LAPAROSCOPIC SURGERY FOR THE PLASMAJET SYSTEM: Brand: PLASMAJET NEUTRAL PLASMA SURGERY SYSTEM

## (undated) DEVICE — TUBING, SUCTION, 3/16" X 10', STRAIGHT: Brand: MEDLINE

## (undated) DEVICE — SUTURE VCRL + SZ 4-0 L27IN ABSRB WHT FS-2 3/8 CIR REV CUT VCP422H

## (undated) DEVICE — SCISSOR SURG CRV ENDOCUT TIP FOR LAP DISP

## (undated) DEVICE — ST CHARLES GYN LAPAROSCOPY PK: Brand: MEDLINE INDUSTRIES, INC.

## (undated) DEVICE — SUTURE PDS + SZ 0 L27IN ABSRB VLT L26MM CT 2 1 2 CIR PDP334H

## (undated) DEVICE — BLANKET WRM W29.9XL79.1IN UP BODY FORC AIR MISTRAL-AIR

## (undated) DEVICE — DRESSING TRNSPAR W2XL2.75IN FLM SHT SEMIPERMEABLE WIND

## (undated) DEVICE — SEALER LAP L37CM MARYLAND JAW OPN NANO COAT MULTIFUNCTIONAL

## (undated) DEVICE — TROCAR: Brand: KII FIOS FIRST ENTRY

## (undated) DEVICE — MERCY HEALTH ST CHARLES: Brand: MEDLINE INDUSTRIES, INC.

## (undated) DEVICE — TROCARS: Brand: KII® BLUNT TIP ACCESS SYSTEM

## (undated) DEVICE — GLOVE ORANGE PI 7   MSG9070

## (undated) DEVICE — INSUFFLATION TUBING SET WITH FILTER, FUNNEL CONNECTOR AND LUER LOCK: Brand: JOSNOE MEDICAL INC

## (undated) DEVICE — Device

## (undated) DEVICE — STRIP,CLOSURE,WOUND,MEDI-STRIP,1/2X4: Brand: MEDLINE

## (undated) DEVICE — SOLUTION IRRIG 1000ML STRL H2O USP PLAS POUR BTL

## (undated) DEVICE — SOLUTION IRRIG 1000ML 0.9% SOD CHL USP POUR PLAS BTL

## (undated) DEVICE — TISSUE RETRIEVAL SYSTEM: Brand: INZII RETRIEVAL SYSTEM